# Patient Record
Sex: MALE | Race: BLACK OR AFRICAN AMERICAN | NOT HISPANIC OR LATINO | URBAN - METROPOLITAN AREA
[De-identification: names, ages, dates, MRNs, and addresses within clinical notes are randomized per-mention and may not be internally consistent; named-entity substitution may affect disease eponyms.]

---

## 2017-03-02 ENCOUNTER — INPATIENT (INPATIENT)
Facility: HOSPITAL | Age: 82
LOS: 3 days | Discharge: HOME | End: 2017-03-06
Attending: INTERNAL MEDICINE | Admitting: INTERNAL MEDICINE
Payer: MEDICARE

## 2017-03-02 VITALS
RESPIRATION RATE: 18 BRPM | SYSTOLIC BLOOD PRESSURE: 181 MMHG | DIASTOLIC BLOOD PRESSURE: 115 MMHG | HEART RATE: 94 BPM | OXYGEN SATURATION: 98 % | TEMPERATURE: 98 F

## 2017-03-02 DIAGNOSIS — I50.9 HEART FAILURE, UNSPECIFIED: ICD-10-CM

## 2017-03-02 DIAGNOSIS — Z29.9 ENCOUNTER FOR PROPHYLACTIC MEASURES, UNSPECIFIED: ICD-10-CM

## 2017-03-02 DIAGNOSIS — I10 ESSENTIAL (PRIMARY) HYPERTENSION: ICD-10-CM

## 2017-03-02 LAB
ALBUMIN SERPL ELPH-MCNC: 4 G/DL — SIGNIFICANT CHANGE UP (ref 3.3–5)
ALP SERPL-CCNC: 105 U/L — SIGNIFICANT CHANGE UP (ref 40–120)
ALT FLD-CCNC: 22 U/L — SIGNIFICANT CHANGE UP (ref 4–41)
APTT BLD: 34.6 SEC — SIGNIFICANT CHANGE UP (ref 27.5–37.4)
AST SERPL-CCNC: 23 U/L — SIGNIFICANT CHANGE UP (ref 4–40)
BASE EXCESS BLDV CALC-SCNC: 3.3 MMOL/L — SIGNIFICANT CHANGE UP
BASOPHILS # BLD AUTO: 0.04 K/UL — SIGNIFICANT CHANGE UP (ref 0–0.2)
BASOPHILS NFR BLD AUTO: 0.5 % — SIGNIFICANT CHANGE UP (ref 0–2)
BILIRUB SERPL-MCNC: 1.6 MG/DL — HIGH (ref 0.2–1.2)
BLOOD GAS VENOUS - CREATININE: 3 MG/DL — HIGH (ref 0.5–1.3)
BUN SERPL-MCNC: 35 MG/DL — HIGH (ref 7–23)
CALCIUM SERPL-MCNC: 9 MG/DL — SIGNIFICANT CHANGE UP (ref 8.4–10.5)
CHLORIDE BLDV-SCNC: 104 MMOL/L — SIGNIFICANT CHANGE UP (ref 96–108)
CHLORIDE SERPL-SCNC: 106 MMOL/L — SIGNIFICANT CHANGE UP (ref 98–107)
CK MB BLD-MCNC: 1.5 — SIGNIFICANT CHANGE UP (ref 0–2.5)
CK MB BLD-MCNC: 1.6 — SIGNIFICANT CHANGE UP (ref 0–2.5)
CK MB BLD-MCNC: 3.33 NG/ML — SIGNIFICANT CHANGE UP (ref 1–6.6)
CK MB BLD-MCNC: 3.91 NG/ML — SIGNIFICANT CHANGE UP (ref 1–6.6)
CK SERPL-CCNC: 217 U/L — HIGH (ref 30–200)
CK SERPL-CCNC: 248 U/L — HIGH (ref 30–200)
CO2 SERPL-SCNC: 25 MMOL/L — SIGNIFICANT CHANGE UP (ref 22–31)
CREAT SERPL-MCNC: 3.01 MG/DL — HIGH (ref 0.5–1.3)
EOSINOPHIL # BLD AUTO: 0.04 K/UL — SIGNIFICANT CHANGE UP (ref 0–0.5)
EOSINOPHIL NFR BLD AUTO: 0.5 % — SIGNIFICANT CHANGE UP (ref 0–6)
GAS PNL BLDV: 139 MMOL/L — SIGNIFICANT CHANGE UP (ref 136–146)
GLUCOSE BLDV-MCNC: 106 — HIGH (ref 70–99)
GLUCOSE SERPL-MCNC: 116 MG/DL — HIGH (ref 70–99)
HCO3 BLDV-SCNC: 26 MMOL/L — SIGNIFICANT CHANGE UP (ref 20–27)
HCT VFR BLD CALC: 41.1 % — SIGNIFICANT CHANGE UP (ref 39–50)
HCT VFR BLDV CALC: 39.9 % — SIGNIFICANT CHANGE UP (ref 39–51)
HGB BLD-MCNC: 12.9 G/DL — LOW (ref 13–17)
HGB BLDV-MCNC: 13 G/DL — SIGNIFICANT CHANGE UP (ref 13–17)
IMM GRANULOCYTES NFR BLD AUTO: 0.1 % — SIGNIFICANT CHANGE UP (ref 0–1.5)
INR BLD: 1.18 — SIGNIFICANT CHANGE UP (ref 0.87–1.18)
LACTATE BLDV-MCNC: 2.4 MMOL/L — HIGH (ref 0.5–2)
LYMPHOCYTES # BLD AUTO: 1.05 K/UL — SIGNIFICANT CHANGE UP (ref 1–3.3)
LYMPHOCYTES # BLD AUTO: 12.5 % — LOW (ref 13–44)
MCHC RBC-ENTMCNC: 27.8 PG — SIGNIFICANT CHANGE UP (ref 27–34)
MCHC RBC-ENTMCNC: 31.4 % — LOW (ref 32–36)
MCV RBC AUTO: 88.6 FL — SIGNIFICANT CHANGE UP (ref 80–100)
MONOCYTES # BLD AUTO: 0.64 K/UL — SIGNIFICANT CHANGE UP (ref 0–0.9)
MONOCYTES NFR BLD AUTO: 7.6 % — SIGNIFICANT CHANGE UP (ref 2–14)
NEUTROPHILS # BLD AUTO: 6.61 K/UL — SIGNIFICANT CHANGE UP (ref 1.8–7.4)
NEUTROPHILS NFR BLD AUTO: 78.8 % — HIGH (ref 43–77)
NT-PROBNP SERPL-SCNC: SIGNIFICANT CHANGE UP PG/ML
PCO2 BLDV: 54 MMHG — HIGH (ref 41–51)
PH BLDV: 7.34 PH — SIGNIFICANT CHANGE UP (ref 7.32–7.43)
PLATELET # BLD AUTO: 204 K/UL — SIGNIFICANT CHANGE UP (ref 150–400)
PMV BLD: 12.5 FL — SIGNIFICANT CHANGE UP (ref 7–13)
PO2 BLDV: 34 MMHG — LOW (ref 35–40)
POTASSIUM BLDV-SCNC: 3.8 MMOL/L — SIGNIFICANT CHANGE UP (ref 3.4–4.5)
POTASSIUM SERPL-MCNC: 4.1 MMOL/L — SIGNIFICANT CHANGE UP (ref 3.5–5.3)
POTASSIUM SERPL-SCNC: 4.1 MMOL/L — SIGNIFICANT CHANGE UP (ref 3.5–5.3)
PROT SERPL-MCNC: 6.4 G/DL — SIGNIFICANT CHANGE UP (ref 6–8.3)
PROTHROM AB SERPL-ACNC: 13.5 SEC — HIGH (ref 10–13.1)
RBC # BLD: 4.64 M/UL — SIGNIFICANT CHANGE UP (ref 4.2–5.8)
RBC # FLD: 15.5 % — HIGH (ref 10.3–14.5)
SAO2 % BLDV: 55.9 % — LOW (ref 60–85)
SODIUM SERPL-SCNC: 148 MMOL/L — HIGH (ref 135–145)
TROPONIN T SERPL-MCNC: 0.06 NG/ML — SIGNIFICANT CHANGE UP (ref 0–0.06)
TROPONIN T SERPL-MCNC: < 0.06 NG/ML — SIGNIFICANT CHANGE UP (ref 0–0.06)
WBC # BLD: 8.39 K/UL — SIGNIFICANT CHANGE UP (ref 3.8–10.5)
WBC # FLD AUTO: 8.39 K/UL — SIGNIFICANT CHANGE UP (ref 3.8–10.5)

## 2017-03-02 PROCEDURE — 74176 CT ABD & PELVIS W/O CONTRAST: CPT | Mod: 26

## 2017-03-02 PROCEDURE — 99223 1ST HOSP IP/OBS HIGH 75: CPT

## 2017-03-02 PROCEDURE — 71010: CPT | Mod: 26

## 2017-03-02 RX ORDER — FUROSEMIDE 40 MG
40 TABLET ORAL ONCE
Qty: 0 | Refills: 0 | Status: COMPLETED | OUTPATIENT
Start: 2017-03-02 | End: 2017-03-02

## 2017-03-02 RX ORDER — NIFEDIPINE 30 MG
90 TABLET, EXTENDED RELEASE 24 HR ORAL ONCE
Qty: 0 | Refills: 0 | Status: COMPLETED | OUTPATIENT
Start: 2017-03-02 | End: 2017-03-02

## 2017-03-02 RX ORDER — VALSARTAN 80 MG/1
160 TABLET ORAL ONCE
Qty: 0 | Refills: 0 | Status: COMPLETED | OUTPATIENT
Start: 2017-03-02 | End: 2017-03-02

## 2017-03-02 RX ORDER — FUROSEMIDE 40 MG
40 TABLET ORAL DAILY
Qty: 0 | Refills: 0 | Status: DISCONTINUED | OUTPATIENT
Start: 2017-03-02 | End: 2017-03-03

## 2017-03-02 RX ORDER — ASPIRIN/CALCIUM CARB/MAGNESIUM 324 MG
81 TABLET ORAL DAILY
Qty: 0 | Refills: 0 | Status: DISCONTINUED | OUTPATIENT
Start: 2017-03-02 | End: 2017-03-06

## 2017-03-02 RX ORDER — SODIUM CHLORIDE 9 MG/ML
3 INJECTION INTRAMUSCULAR; INTRAVENOUS; SUBCUTANEOUS EVERY 8 HOURS
Qty: 0 | Refills: 0 | Status: DISCONTINUED | OUTPATIENT
Start: 2017-03-02 | End: 2017-03-06

## 2017-03-02 RX ORDER — NIFEDIPINE 30 MG
90 TABLET, EXTENDED RELEASE 24 HR ORAL DAILY
Qty: 0 | Refills: 0 | Status: DISCONTINUED | OUTPATIENT
Start: 2017-03-02 | End: 2017-03-03

## 2017-03-02 RX ORDER — HEPARIN SODIUM 5000 [USP'U]/ML
5000 INJECTION INTRAVENOUS; SUBCUTANEOUS EVERY 8 HOURS
Qty: 0 | Refills: 0 | Status: DISCONTINUED | OUTPATIENT
Start: 2017-03-02 | End: 2017-03-06

## 2017-03-02 RX ADMIN — VALSARTAN 160 MILLIGRAM(S): 80 TABLET ORAL at 21:30

## 2017-03-02 RX ADMIN — Medication 40 MILLIGRAM(S): at 16:50

## 2017-03-02 RX ADMIN — Medication 90 MILLIGRAM(S): at 21:13

## 2017-03-02 NOTE — ED ADULT TRIAGE NOTE - CHIEF COMPLAINT QUOTE
p/t c/o of sob and swelling to ble for past few days p/t denies any chest discomfort appears uncomfortable

## 2017-03-02 NOTE — ED PROVIDER NOTE - MEDICAL DECISION MAKING DETAILS
80 yo M PMH HTN c/o 6 months SOB and abdominal distension  R/O Heart Failure, Abdominal Obstruction  CBC/CMP, ProBNP, CEx2, EKG, CXR, possible CT Abdomen

## 2017-03-02 NOTE — ED ADULT NURSE NOTE - OBJECTIVE STATEMENT
Pt recd A+Ox3. C/o +MASON and b/l LE swelling x6 months. States that he came into ED today because he just didn't feel right. Swollen, rounded, distended abd noted on assessment. Pt c/o feeling swollen in abd area but denies pain. Denies CP, palpitations, weight loss ot gain that he noticed. +3 edema noted to b/l LE and abd on assessment with small areas of weeping. Pt able to speak in full sentences without distress and O2 sat 100% on RA. Cardiac monitor in place. Will continue to monitor.

## 2017-03-02 NOTE — H&P ADULT. - NEGATIVE ENMT SYMPTOMS
no abnormal taste sensation/no tinnitus/no nose bleeds/no sinus symptoms/no post-nasal discharge/no nasal congestion/no vertigo/no gum bleeding

## 2017-03-02 NOTE — H&P ADULT. - NEGATIVE OPHTHALMOLOGIC SYMPTOMS
no lacrimation R/no discharge R/no discharge L/no photophobia/no lacrimation L/no blurred vision L/no blurred vision R

## 2017-03-02 NOTE — ED PROVIDER NOTE - OBJECTIVE STATEMENT
82 yo M [facility] HTN and glaucoma presents with SOB and abdominal distension for the past 6 months. Pt reports intermittent SOB, exacerbated by exertion and relieved by rest. c/o sob x 6 months- intermittent in nature - states cannot walk more than 2 blocks ore one flight of stairs without becoming dyspneic  Abdominal distension has been present consistently for the past 6 months, he feels the need to move his bowels frequently. Pt admits to having increased LE edema over the past 6 months as well. Pt used to take antihypertensives, but has not taken them in a long time because he states his pressure was normal. Seen by his ophthalmologist today who is treating him for an eye infection of the right eye, pt denies any occular pain or loss of vision. Has not been to his PMD in over a year. Denies cough, hemoptysis, chest pain, syncope, dizziness, N/V/D, constipation, hematochezia, dysuria, incontinence, hematuria.

## 2017-03-02 NOTE — H&P ADULT. - PROBLEM SELECTOR PLAN 1
CM  F/U CE & EKG   Monitor I's O's daily weights and Lytes   Give O2, ASA, Lasix 40 mg IV, Zaroxolyn po   F/U TTE, PT and Dietary consults

## 2017-03-03 LAB
BUN SERPL-MCNC: 37 MG/DL — HIGH (ref 7–23)
CALCIUM SERPL-MCNC: 9.2 MG/DL — SIGNIFICANT CHANGE UP (ref 8.4–10.5)
CHLORIDE SERPL-SCNC: 101 MMOL/L — SIGNIFICANT CHANGE UP (ref 98–107)
CHOLEST SERPL-MCNC: 130 MG/DL — SIGNIFICANT CHANGE UP (ref 120–199)
CO2 SERPL-SCNC: 26 MMOL/L — SIGNIFICANT CHANGE UP (ref 22–31)
CREAT SERPL-MCNC: 2.83 MG/DL — HIGH (ref 0.5–1.3)
GLUCOSE SERPL-MCNC: 109 MG/DL — HIGH (ref 70–99)
HBA1C BLD-MCNC: 5.8 % — HIGH (ref 4–5.6)
HCT VFR BLD CALC: 39.9 % — SIGNIFICANT CHANGE UP (ref 39–50)
HDLC SERPL-MCNC: 43 MG/DL — SIGNIFICANT CHANGE UP (ref 35–55)
HGB BLD-MCNC: 12.4 G/DL — LOW (ref 13–17)
LIPID PNL WITH DIRECT LDL SERPL: 76 MG/DL — SIGNIFICANT CHANGE UP
MCHC RBC-ENTMCNC: 27.1 PG — SIGNIFICANT CHANGE UP (ref 27–34)
MCHC RBC-ENTMCNC: 31.1 % — LOW (ref 32–36)
MCV RBC AUTO: 87.3 FL — SIGNIFICANT CHANGE UP (ref 80–100)
PLATELET # BLD AUTO: 206 K/UL — SIGNIFICANT CHANGE UP (ref 150–400)
PMV BLD: 13.2 FL — HIGH (ref 7–13)
POTASSIUM SERPL-MCNC: 3.9 MMOL/L — SIGNIFICANT CHANGE UP (ref 3.5–5.3)
POTASSIUM SERPL-SCNC: 3.9 MMOL/L — SIGNIFICANT CHANGE UP (ref 3.5–5.3)
RBC # BLD: 4.57 M/UL — SIGNIFICANT CHANGE UP (ref 4.2–5.8)
RBC # FLD: 15.6 % — HIGH (ref 10.3–14.5)
SODIUM SERPL-SCNC: 144 MMOL/L — SIGNIFICANT CHANGE UP (ref 135–145)
TRIGL SERPL-MCNC: 86 MG/DL — SIGNIFICANT CHANGE UP (ref 10–149)
WBC # BLD: 9.81 K/UL — SIGNIFICANT CHANGE UP (ref 3.8–10.5)
WBC # FLD AUTO: 9.81 K/UL — SIGNIFICANT CHANGE UP (ref 3.8–10.5)

## 2017-03-03 PROCEDURE — 99223 1ST HOSP IP/OBS HIGH 75: CPT | Mod: GC

## 2017-03-03 PROCEDURE — 99233 SBSQ HOSP IP/OBS HIGH 50: CPT

## 2017-03-03 PROCEDURE — 93306 TTE W/DOPPLER COMPLETE: CPT | Mod: 26

## 2017-03-03 RX ORDER — CARVEDILOL PHOSPHATE 80 MG/1
6.25 CAPSULE, EXTENDED RELEASE ORAL EVERY 12 HOURS
Qty: 0 | Refills: 0 | Status: DISCONTINUED | OUTPATIENT
Start: 2017-03-03 | End: 2017-03-05

## 2017-03-03 RX ORDER — LANOLIN ALCOHOL/MO/W.PET/CERES
3 CREAM (GRAM) TOPICAL AT BEDTIME
Qty: 0 | Refills: 0 | Status: DISCONTINUED | OUTPATIENT
Start: 2017-03-03 | End: 2017-03-06

## 2017-03-03 RX ORDER — FUROSEMIDE 40 MG
40 TABLET ORAL
Qty: 0 | Refills: 0 | Status: DISCONTINUED | OUTPATIENT
Start: 2017-03-03 | End: 2017-03-05

## 2017-03-03 RX ADMIN — HEPARIN SODIUM 5000 UNIT(S): 5000 INJECTION INTRAVENOUS; SUBCUTANEOUS at 12:43

## 2017-03-03 RX ADMIN — Medication 81 MILLIGRAM(S): at 12:42

## 2017-03-03 RX ADMIN — SODIUM CHLORIDE 3 MILLILITER(S): 9 INJECTION INTRAMUSCULAR; INTRAVENOUS; SUBCUTANEOUS at 23:11

## 2017-03-03 RX ADMIN — Medication 40 MILLIGRAM(S): at 17:32

## 2017-03-03 RX ADMIN — Medication 90 MILLIGRAM(S): at 05:28

## 2017-03-03 RX ADMIN — Medication 3 MILLIGRAM(S): at 02:09

## 2017-03-03 RX ADMIN — CARVEDILOL PHOSPHATE 6.25 MILLIGRAM(S): 80 CAPSULE, EXTENDED RELEASE ORAL at 17:35

## 2017-03-03 RX ADMIN — SODIUM CHLORIDE 3 MILLILITER(S): 9 INJECTION INTRAMUSCULAR; INTRAVENOUS; SUBCUTANEOUS at 12:43

## 2017-03-03 RX ADMIN — Medication 40 MILLIGRAM(S): at 05:28

## 2017-03-03 RX ADMIN — HEPARIN SODIUM 5000 UNIT(S): 5000 INJECTION INTRAVENOUS; SUBCUTANEOUS at 05:28

## 2017-03-03 RX ADMIN — SODIUM CHLORIDE 3 MILLILITER(S): 9 INJECTION INTRAMUSCULAR; INTRAVENOUS; SUBCUTANEOUS at 06:33

## 2017-03-03 RX ADMIN — HEPARIN SODIUM 5000 UNIT(S): 5000 INJECTION INTRAVENOUS; SUBCUTANEOUS at 23:11

## 2017-03-03 NOTE — PHYSICAL THERAPY INITIAL EVALUATION ADULT - PERTINENT HX OF CURRENT PROBLEM, REHAB EVAL
Pt is a 80 y/o male self with history of HTN has been experiencing progressive SOB over the past few months and abdominal bloating.

## 2017-03-03 NOTE — DIETITIAN INITIAL EVALUATION ADULT. - OTHER INFO
Received nutrition consult for CHF Exacerbation. Reports good appetite and po intake and denies nausea/vomiting/diarrhea/constipation or any issues with chewing/swallowing. Reinforced low na diet principles in view of heart failure.

## 2017-03-04 LAB
APPEARANCE UR: CLEAR — SIGNIFICANT CHANGE UP
BILIRUB UR-MCNC: NEGATIVE — SIGNIFICANT CHANGE UP
BLOOD UR QL VISUAL: HIGH
BUN SERPL-MCNC: 36 MG/DL — HIGH (ref 7–23)
CALCIUM SERPL-MCNC: 9 MG/DL — SIGNIFICANT CHANGE UP (ref 8.4–10.5)
CHLORIDE SERPL-SCNC: 98 MMOL/L — SIGNIFICANT CHANGE UP (ref 98–107)
CO2 SERPL-SCNC: 28 MMOL/L — SIGNIFICANT CHANGE UP (ref 22–31)
COLOR SPEC: SIGNIFICANT CHANGE UP
CREAT ?TM UR-MCNC: 33.88 MG/DL — SIGNIFICANT CHANGE UP
CREAT SERPL-MCNC: 2.55 MG/DL — HIGH (ref 0.5–1.3)
GLUCOSE SERPL-MCNC: 103 MG/DL — HIGH (ref 70–99)
GLUCOSE UR-MCNC: NEGATIVE — SIGNIFICANT CHANGE UP
HCT VFR BLD CALC: 38.8 % — LOW (ref 39–50)
HGB BLD-MCNC: 12.3 G/DL — LOW (ref 13–17)
KETONES UR-MCNC: NEGATIVE — SIGNIFICANT CHANGE UP
LEUKOCYTE ESTERASE UR-ACNC: NEGATIVE — SIGNIFICANT CHANGE UP
MAGNESIUM SERPL-MCNC: 1.8 MG/DL — SIGNIFICANT CHANGE UP (ref 1.6–2.6)
MCHC RBC-ENTMCNC: 27.8 PG — SIGNIFICANT CHANGE UP (ref 27–34)
MCHC RBC-ENTMCNC: 31.7 % — LOW (ref 32–36)
MCV RBC AUTO: 87.8 FL — SIGNIFICANT CHANGE UP (ref 80–100)
MUCOUS THREADS # UR AUTO: SIGNIFICANT CHANGE UP
NITRITE UR-MCNC: NEGATIVE — SIGNIFICANT CHANGE UP
OSMOLALITY SERPL: 294 MOSMO/KG — SIGNIFICANT CHANGE UP (ref 275–295)
PH UR: 6 — SIGNIFICANT CHANGE UP (ref 4.6–8)
PHOSPHATE SERPL-MCNC: 3.4 MG/DL — SIGNIFICANT CHANGE UP (ref 2.5–4.5)
PLATELET # BLD AUTO: 190 K/UL — SIGNIFICANT CHANGE UP (ref 150–400)
PMV BLD: 12.7 FL — SIGNIFICANT CHANGE UP (ref 7–13)
POTASSIUM SERPL-MCNC: 3.7 MMOL/L — SIGNIFICANT CHANGE UP (ref 3.5–5.3)
POTASSIUM SERPL-SCNC: 3.7 MMOL/L — SIGNIFICANT CHANGE UP (ref 3.5–5.3)
PROT UR-MCNC: 10 — SIGNIFICANT CHANGE UP
PROT UR-MCNC: 15.2 MG/DL — SIGNIFICANT CHANGE UP
RBC # BLD: 4.42 M/UL — SIGNIFICANT CHANGE UP (ref 4.2–5.8)
RBC # FLD: 15.4 % — HIGH (ref 10.3–14.5)
RBC CASTS # UR COMP ASSIST: >50 — HIGH (ref 0–?)
SODIUM SERPL-SCNC: 143 MMOL/L — SIGNIFICANT CHANGE UP (ref 135–145)
SODIUM UR-SCNC: 125 MEQ/L — SIGNIFICANT CHANGE UP
SP GR SPEC: 1.01 — SIGNIFICANT CHANGE UP (ref 1–1.03)
UROBILINOGEN FLD QL: NORMAL E.U. — SIGNIFICANT CHANGE UP (ref 0.1–0.2)
UUN UR-MCNC: 164.2 MG/DL — SIGNIFICANT CHANGE UP
WBC # BLD: 7.22 K/UL — SIGNIFICANT CHANGE UP (ref 3.8–10.5)
WBC # FLD AUTO: 7.22 K/UL — SIGNIFICANT CHANGE UP (ref 3.8–10.5)
WBC UR QL: SIGNIFICANT CHANGE UP (ref 0–?)

## 2017-03-04 PROCEDURE — 99233 SBSQ HOSP IP/OBS HIGH 50: CPT | Mod: GC

## 2017-03-04 PROCEDURE — 99232 SBSQ HOSP IP/OBS MODERATE 35: CPT

## 2017-03-04 RX ADMIN — HEPARIN SODIUM 5000 UNIT(S): 5000 INJECTION INTRAVENOUS; SUBCUTANEOUS at 21:33

## 2017-03-04 RX ADMIN — CARVEDILOL PHOSPHATE 6.25 MILLIGRAM(S): 80 CAPSULE, EXTENDED RELEASE ORAL at 06:10

## 2017-03-04 RX ADMIN — Medication 40 MILLIGRAM(S): at 17:04

## 2017-03-04 RX ADMIN — HEPARIN SODIUM 5000 UNIT(S): 5000 INJECTION INTRAVENOUS; SUBCUTANEOUS at 06:10

## 2017-03-04 RX ADMIN — Medication 3 MILLIGRAM(S): at 21:38

## 2017-03-04 RX ADMIN — SODIUM CHLORIDE 3 MILLILITER(S): 9 INJECTION INTRAMUSCULAR; INTRAVENOUS; SUBCUTANEOUS at 06:09

## 2017-03-04 RX ADMIN — Medication 81 MILLIGRAM(S): at 11:41

## 2017-03-04 RX ADMIN — Medication 40 MILLIGRAM(S): at 06:10

## 2017-03-04 RX ADMIN — SODIUM CHLORIDE 3 MILLILITER(S): 9 INJECTION INTRAMUSCULAR; INTRAVENOUS; SUBCUTANEOUS at 13:16

## 2017-03-04 RX ADMIN — CARVEDILOL PHOSPHATE 6.25 MILLIGRAM(S): 80 CAPSULE, EXTENDED RELEASE ORAL at 17:04

## 2017-03-04 RX ADMIN — SODIUM CHLORIDE 3 MILLILITER(S): 9 INJECTION INTRAMUSCULAR; INTRAVENOUS; SUBCUTANEOUS at 21:31

## 2017-03-04 RX ADMIN — HEPARIN SODIUM 5000 UNIT(S): 5000 INJECTION INTRAVENOUS; SUBCUTANEOUS at 13:16

## 2017-03-05 LAB
BUN SERPL-MCNC: 38 MG/DL — HIGH (ref 7–23)
CALCIUM SERPL-MCNC: 8.5 MG/DL — SIGNIFICANT CHANGE UP (ref 8.4–10.5)
CHLORIDE SERPL-SCNC: 95 MMOL/L — LOW (ref 98–107)
CO2 SERPL-SCNC: 28 MMOL/L — SIGNIFICANT CHANGE UP (ref 22–31)
CREAT SERPL-MCNC: 2.66 MG/DL — HIGH (ref 0.5–1.3)
GLUCOSE SERPL-MCNC: 132 MG/DL — HIGH (ref 70–99)
HCT VFR BLD CALC: 35.6 % — LOW (ref 39–50)
HGB BLD-MCNC: 11.4 G/DL — LOW (ref 13–17)
MAGNESIUM SERPL-MCNC: 1.7 MG/DL — SIGNIFICANT CHANGE UP (ref 1.6–2.6)
MCHC RBC-ENTMCNC: 28.1 PG — SIGNIFICANT CHANGE UP (ref 27–34)
MCHC RBC-ENTMCNC: 32 % — SIGNIFICANT CHANGE UP (ref 32–36)
MCV RBC AUTO: 87.7 FL — SIGNIFICANT CHANGE UP (ref 80–100)
OSMOLALITY SERPL: 294 MOSMO/KG — SIGNIFICANT CHANGE UP (ref 275–295)
PHOSPHATE SERPL-MCNC: 3.5 MG/DL — SIGNIFICANT CHANGE UP (ref 2.5–4.5)
PLATELET # BLD AUTO: 190 K/UL — SIGNIFICANT CHANGE UP (ref 150–400)
PMV BLD: 12.6 FL — SIGNIFICANT CHANGE UP (ref 7–13)
POTASSIUM SERPL-MCNC: 3.5 MMOL/L — SIGNIFICANT CHANGE UP (ref 3.5–5.3)
POTASSIUM SERPL-SCNC: 3.5 MMOL/L — SIGNIFICANT CHANGE UP (ref 3.5–5.3)
RBC # BLD: 4.06 M/UL — LOW (ref 4.2–5.8)
RBC # FLD: 15.1 % — HIGH (ref 10.3–14.5)
SODIUM SERPL-SCNC: 139 MMOL/L — SIGNIFICANT CHANGE UP (ref 135–145)
WBC # BLD: 6.89 K/UL — SIGNIFICANT CHANGE UP (ref 3.8–10.5)
WBC # FLD AUTO: 6.89 K/UL — SIGNIFICANT CHANGE UP (ref 3.8–10.5)

## 2017-03-05 PROCEDURE — 99232 SBSQ HOSP IP/OBS MODERATE 35: CPT

## 2017-03-05 PROCEDURE — 99233 SBSQ HOSP IP/OBS HIGH 50: CPT | Mod: GC

## 2017-03-05 RX ORDER — POTASSIUM CHLORIDE 20 MEQ
40 PACKET (EA) ORAL ONCE
Qty: 0 | Refills: 0 | Status: COMPLETED | OUTPATIENT
Start: 2017-03-05 | End: 2017-03-05

## 2017-03-05 RX ORDER — CARVEDILOL PHOSPHATE 80 MG/1
12.5 CAPSULE, EXTENDED RELEASE ORAL EVERY 12 HOURS
Qty: 0 | Refills: 0 | Status: DISCONTINUED | OUTPATIENT
Start: 2017-03-05 | End: 2017-03-06

## 2017-03-05 RX ORDER — ISOSORBIDE MONONITRATE 60 MG/1
30 TABLET, EXTENDED RELEASE ORAL DAILY
Qty: 0 | Refills: 0 | Status: DISCONTINUED | OUTPATIENT
Start: 2017-03-05 | End: 2017-03-06

## 2017-03-05 RX ORDER — HYDRALAZINE HCL 50 MG
25 TABLET ORAL THREE TIMES A DAY
Qty: 0 | Refills: 0 | Status: DISCONTINUED | OUTPATIENT
Start: 2017-03-05 | End: 2017-03-06

## 2017-03-05 RX ORDER — FUROSEMIDE 40 MG
40 TABLET ORAL EVERY 12 HOURS
Qty: 0 | Refills: 0 | Status: DISCONTINUED | OUTPATIENT
Start: 2017-03-05 | End: 2017-03-06

## 2017-03-05 RX ADMIN — CARVEDILOL PHOSPHATE 12.5 MILLIGRAM(S): 80 CAPSULE, EXTENDED RELEASE ORAL at 17:58

## 2017-03-05 RX ADMIN — Medication 40 MILLIEQUIVALENT(S): at 17:58

## 2017-03-05 RX ADMIN — Medication 40 MILLIGRAM(S): at 17:58

## 2017-03-05 RX ADMIN — ISOSORBIDE MONONITRATE 30 MILLIGRAM(S): 60 TABLET, EXTENDED RELEASE ORAL at 12:29

## 2017-03-05 RX ADMIN — Medication 25 MILLIGRAM(S): at 21:46

## 2017-03-05 RX ADMIN — SODIUM CHLORIDE 3 MILLILITER(S): 9 INJECTION INTRAMUSCULAR; INTRAVENOUS; SUBCUTANEOUS at 14:53

## 2017-03-05 RX ADMIN — Medication 81 MILLIGRAM(S): at 12:29

## 2017-03-05 RX ADMIN — HEPARIN SODIUM 5000 UNIT(S): 5000 INJECTION INTRAVENOUS; SUBCUTANEOUS at 21:46

## 2017-03-05 RX ADMIN — SODIUM CHLORIDE 3 MILLILITER(S): 9 INJECTION INTRAMUSCULAR; INTRAVENOUS; SUBCUTANEOUS at 06:09

## 2017-03-05 RX ADMIN — SODIUM CHLORIDE 3 MILLILITER(S): 9 INJECTION INTRAMUSCULAR; INTRAVENOUS; SUBCUTANEOUS at 21:46

## 2017-03-05 RX ADMIN — Medication 25 MILLIGRAM(S): at 13:54

## 2017-03-05 RX ADMIN — HEPARIN SODIUM 5000 UNIT(S): 5000 INJECTION INTRAVENOUS; SUBCUTANEOUS at 13:54

## 2017-03-05 RX ADMIN — Medication 25 MILLIGRAM(S): at 06:09

## 2017-03-05 RX ADMIN — CARVEDILOL PHOSPHATE 12.5 MILLIGRAM(S): 80 CAPSULE, EXTENDED RELEASE ORAL at 06:08

## 2017-03-05 RX ADMIN — Medication 3 MILLIGRAM(S): at 21:46

## 2017-03-05 RX ADMIN — Medication 40 MILLIGRAM(S): at 06:09

## 2017-03-05 RX ADMIN — HEPARIN SODIUM 5000 UNIT(S): 5000 INJECTION INTRAVENOUS; SUBCUTANEOUS at 06:09

## 2017-03-05 NOTE — DISCHARGE NOTE ADULT - CARE PROVIDER_API CALL
Rasheed Gaines (MD; PhD), Cardiology; Internal Medicine; Vascular Medicine  [address] [place] [address]  Phone: [phone]  Fax: [phone] Rasheed Gaines (MD; PhD), Cardiology; Internal Medicine; Vascular Medicine  [id number] [place] [address]  Phone: [phone]  Fax: [phone]    Diane Bruner), Internal Medicine  [address] [place] [address]  Phone: [phone]  Fax: [phone]

## 2017-03-05 NOTE — PROVIDER CONTACT NOTE (OTHER) - ASSESSMENT
Patient has lee, patients tip of pens swollen, red and ? rash. Patient denies any discomfort to site

## 2017-03-05 NOTE — DISCHARGE NOTE ADULT - CARE PLAN
Principal Discharge DX:	Acute on chronic diastolic HF (heart failure)  Goal:	Maintain euvolemia and prevent worsening of heart failure.  Instructions for follow-up, activity and diet:	Follow up with PMD within 1 week. Take medications as prescribed. Monitor weight daily and record results. Bring these results to MD visit.  Diet - low salt, 1800 jacky ADA cardiac diet.  Secondary Diagnosis:	Essential hypertension  Goal:	Blood pressure control  Instructions for follow-up, activity and diet:	Low salt, low fat diet.  Take blood pressure medications as prescribed.  Please follow up with your PMD in 1-2 weeks for further medical management

## 2017-03-05 NOTE — DISCHARGE NOTE ADULT - MEDICATION SUMMARY - MEDICATIONS TO STOP TAKING
I will STOP taking the medications listed below when I get home from the hospital:    NIFEdipine 90 mg oral tablet, extended release  -- 1 tab(s) by mouth once a day

## 2017-03-05 NOTE — DISCHARGE NOTE ADULT - NS AS DC HF EDUCATION INSTRUCTIONS
Activities as tolerated/Call Primary Care Provider for follow-up after discharge/Monitor Weight Daily/Low salt diet/Report weight gain of 2 or more pounds in one day or 3 or more pounds in one week, worsening shortness of breath, fatigue, weakness, increased swelling of hands and feet to primary care provider

## 2017-03-05 NOTE — DISCHARGE NOTE ADULT - CARE PROVIDERS DIRECT ADDRESSES
[email or web address]

## 2017-03-05 NOTE — DISCHARGE NOTE ADULT - MEDICATION SUMMARY - MEDICATIONS TO TAKE
I will START or STAY ON the medications listed below when I get home from the hospital:    aspirin 81 mg oral delayed release tablet  -- 1 tab(s) by mouth once a day  -- Indication: For Cardioprotective    isosorbide mononitrate 30 mg oral tablet, extended release  -- 1 tab(s) by mouth once a day  -- Indication: For Heart failure    carvedilol 12.5 mg oral tablet  -- 1 tab(s) by mouth every 12 hours  -- Indication: For Hypertension    furosemide 40 mg oral tablet  -- 1 tab(s) by mouth every 12 hours  -- Indication: For Heart failure    metOLazone 2.5 mg oral tablet  -- 1 tab(s) by mouth once a day  -- Indication: For Heart failure    hydrALAZINE 25 mg oral tablet  -- 1 tab(s) by mouth every 8 hours  -- Indication: For Hypertension

## 2017-03-05 NOTE — DISCHARGE NOTE ADULT - PLAN OF CARE
Maintain euvolemia and prevent worsening of heart failure. Follow up with PMD within 1 week. Take medications as prescribed. Monitor weight daily and record results. Bring these results to MD visit.  Diet - low salt, 1800 jacky ADA cardiac diet. Blood pressure control Low salt, low fat diet.  Take blood pressure medications as prescribed.  Please follow up with your PMD in 1-2 weeks for further medical management

## 2017-03-05 NOTE — DISCHARGE NOTE ADULT - PATIENT PORTAL LINK FT
“You can access the FollowHealth Patient Portal, offered by [facility], by registering with the following website: [email or web address]”

## 2017-03-05 NOTE — DISCHARGE NOTE ADULT - ADDITIONAL INSTRUCTIONS
Please follow up with Dr. Gaines (Cardiologist) and Dr. Bruner (Renal) in 2 weeks.  Call for appointments. Please follow up with Dr. Gaines (Cardiologist) and Dr. Bruner (Renal) in 2 weeks.  Call for appointments.  Please call your PCP in hematuria or penile pain persists.

## 2017-03-05 NOTE — DISCHARGE NOTE ADULT - HOSPITAL COURSE
81M self ambulating, with of HTN has been experiencing progressive SOB over the past few months and abdominal bloating.  He denied dietary indiscretions with salt foods and recent weigh gain. He is able to ambulate a few blocks before experiencing dyspnea. Positive dry cough. No chest pain, palpitations, nausea, vomit, chills, diaphoresis.     Hospital Course    Cardiac enzymes tested were negative.  CXR showed Clear lungs with cardiomegaly. CT Abd showed no bowel obstruction or acute intra-abdominal pathology, cardiomegaly, small bilateral pleural effusions. Small ascites and anasarca. Patient was diuresed with Lasix and metolazone. Procardia was discontinued and Hydralazine, Imdur and Coreg were started and titrated.  Renal was consulted and recommended Renal sono that showed _________________.  TTE: Minimal mitral regurgitation. Calcified trileaflet aortic valve with normal opening. Minimal aortic regurgitation. Mildly dilated left atrium.  LA volume index = 38 cc/m2.Normal left ventricular internal dimensions and wall thicknesses. Normal left ventricular systolic function. No segmental wall motion abnormalities. Normal right ventricular size and function. Normal tricuspid valve. Mild tricuspid regurgitation.mild pulmonary hypertension. 81M self ambulating, with of HTN has been experiencing progressive SOB over the past few months and abdominal bloating.  He denied dietary indiscretions with salt foods and recent weigh gain. He is able to ambulate a few blocks before experiencing dyspnea. Positive dry cough. No chest pain, palpitations, nausea, vomit, chills, diaphoresis.     Hospital Course    Cardiac enzymes tested were negative.  CXR showed Clear lungs with cardiomegaly. CT Abd showed no bowel obstruction or acute intra-abdominal pathology, cardiomegaly, small bilateral pleural effusions. Small ascites and anasarca. Patient was diuresed with Lasix and metolazone. Procardia was discontinued and Hydralazine, Imdur and Coreg were started and titrated.  Renal was consulted and recommended Renal sonogram that showed a prominent prostate and no hydronephrosis.  TTE: Minimal mitral regurgitation. Calcified trileaflet aortic valve with normal opening. Minimal aortic regurgitation. Mildly dilated left atrium.  LA volume index = 38 cc/m2.Normal left ventricular internal dimensions and wall thicknesses. Normal left ventricular systolic function. No segmental wall motion abnormalities. Normal right ventricular size and function. Normal tricuspid valve. Mild tricuspid regurgitation.mild pulmonary hypertension. 81M self ambulating, with of HTN has been experiencing progressive SOB over the past few months and abdominal bloating.  He denied dietary indiscretions with salt foods and recent weigh gain. He is able to ambulate a few blocks before experiencing dyspnea. Positive dry cough. No chest pain, palpitations, nausea, vomit, chills, diaphoresis.     Hospital Course    Cardiac enzymes tested were negative.  CXR showed Clear lungs with cardiomegaly. CT Abd showed no bowel obstruction or acute intra-abdominal pathology, cardiomegaly, small bilateral pleural effusions. Small ascites and anasarca. Patient was diuresed with Lasix and metolazone. Procardia was discontinued and Hydralazine, Imdur and Coreg were started and titrated.  Renal was consulted and recommended Renal sonogram that showed a prominent prostate and no hydronephrosis.  TTE: Minimal mitral regurgitation. Calcified trileaflet aortic valve with normal opening. Minimal aortic regurgitation. Mildly dilated left atrium.  LA volume index = 38 cc/m2.Normal left ventricular internal dimensions and wall thicknesses. Normal left ventricular systolic function. No segmental wall motion abnormalities. Normal right ventricular size and function. Normal tricuspid valve. Mild tricuspid regurgitation.mild pulmonary hypertension.  Patient was switched to PO Lasix.  Renal ultrasound done on 3/6 showed prominent prostate and no hydronephrosis.  On 3/6, lee was removed and patient was noted to have hematuria and penile discoloration.  Urology consulted and    Patient is now clear for discharge home. 81M self ambulating, with of HTN has been experiencing progressive SOB over the past few months and abdominal bloating.  He denied dietary indiscretions with salt foods and recent weigh gain. He is able to ambulate a few blocks before experiencing dyspnea. Positive dry cough. No chest pain, palpitations, nausea, vomit, chills, diaphoresis.     Hospital Course    Cardiac enzymes tested were negative.  CXR showed Clear lungs with cardiomegaly. CT Abd showed no bowel obstruction or acute intra-abdominal pathology, cardiomegaly, small bilateral pleural effusions. Small ascites and anasarca. Patient was diuresed with Lasix and metolazone. Procardia was discontinued and Hydralazine, Imdur and Coreg were started and titrated.  Renal was consulted and recommended Renal sonogram that showed a prominent prostate and no hydronephrosis.  TTE: Minimal mitral regurgitation. Calcified trileaflet aortic valve with normal opening. Minimal aortic regurgitation. Mildly dilated left atrium.  LA volume index = 38 cc/m2.Normal left ventricular internal dimensions and wall thicknesses. Normal left ventricular systolic function. No segmental wall motion abnormalities. Normal right ventricular size and function. Normal tricuspid valve. Mild tricuspid regurgitation.mild pulmonary hypertension.  Patient was switched to PO Lasix.  Renal ultrasound done on 3/6 showed prominent prostate and no hydronephrosis.  On 3/6, lee was removed and patient was noted to have hematuria and penile discoloration.  Urology consulted and felt patient with normal anatomy and if symptoms persist he should contact his PMD.      Patient is now clear for discharge home.

## 2017-03-06 VITALS
DIASTOLIC BLOOD PRESSURE: 97 MMHG | HEART RATE: 79 BPM | OXYGEN SATURATION: 99 % | RESPIRATION RATE: 16 BRPM | SYSTOLIC BLOOD PRESSURE: 141 MMHG

## 2017-03-06 LAB
BUN SERPL-MCNC: 43 MG/DL — HIGH (ref 7–23)
CALCIUM SERPL-MCNC: 9 MG/DL — SIGNIFICANT CHANGE UP (ref 8.4–10.5)
CHLORIDE SERPL-SCNC: 94 MMOL/L — LOW (ref 98–107)
CO2 SERPL-SCNC: 28 MMOL/L — SIGNIFICANT CHANGE UP (ref 22–31)
CREAT SERPL-MCNC: 2.88 MG/DL — HIGH (ref 0.5–1.3)
GLUCOSE SERPL-MCNC: 100 MG/DL — HIGH (ref 70–99)
HCT VFR BLD CALC: 38.1 % — LOW (ref 39–50)
HGB BLD-MCNC: 12.1 G/DL — LOW (ref 13–17)
MAGNESIUM SERPL-MCNC: 1.8 MG/DL — SIGNIFICANT CHANGE UP (ref 1.6–2.6)
MCHC RBC-ENTMCNC: 27.5 PG — SIGNIFICANT CHANGE UP (ref 27–34)
MCHC RBC-ENTMCNC: 31.8 % — LOW (ref 32–36)
MCV RBC AUTO: 86.6 FL — SIGNIFICANT CHANGE UP (ref 80–100)
PLATELET # BLD AUTO: 206 K/UL — SIGNIFICANT CHANGE UP (ref 150–400)
PMV BLD: 13.1 FL — HIGH (ref 7–13)
POTASSIUM SERPL-MCNC: 3.7 MMOL/L — SIGNIFICANT CHANGE UP (ref 3.5–5.3)
POTASSIUM SERPL-SCNC: 3.7 MMOL/L — SIGNIFICANT CHANGE UP (ref 3.5–5.3)
RBC # BLD: 4.4 M/UL — SIGNIFICANT CHANGE UP (ref 4.2–5.8)
RBC # FLD: 15 % — HIGH (ref 10.3–14.5)
SODIUM SERPL-SCNC: 139 MMOL/L — SIGNIFICANT CHANGE UP (ref 135–145)
WBC # BLD: 7.53 K/UL — SIGNIFICANT CHANGE UP (ref 3.8–10.5)
WBC # FLD AUTO: 7.53 K/UL — SIGNIFICANT CHANGE UP (ref 3.8–10.5)

## 2017-03-06 PROCEDURE — 76775 US EXAM ABDO BACK WALL LIM: CPT | Mod: 26

## 2017-03-06 PROCEDURE — 99233 SBSQ HOSP IP/OBS HIGH 50: CPT | Mod: GC

## 2017-03-06 PROCEDURE — 99239 HOSP IP/OBS DSCHRG MGMT >30: CPT

## 2017-03-06 RX ORDER — NIFEDIPINE 30 MG
1 TABLET, EXTENDED RELEASE 24 HR ORAL
Qty: 0 | Refills: 0 | COMMUNITY

## 2017-03-06 RX ORDER — ASPIRIN/CALCIUM CARB/MAGNESIUM 324 MG
1 TABLET ORAL
Qty: 30 | Refills: 0
Start: 2017-03-06 | End: 2017-04-05

## 2017-03-06 RX ORDER — CARVEDILOL PHOSPHATE 80 MG/1
1 CAPSULE, EXTENDED RELEASE ORAL
Qty: 60 | Refills: 0
Start: 2017-03-06 | End: 2017-04-05

## 2017-03-06 RX ORDER — FUROSEMIDE 40 MG
1 TABLET ORAL
Qty: 60 | Refills: 0
Start: 2017-03-06 | End: 2017-04-05

## 2017-03-06 RX ORDER — HYDRALAZINE HCL 50 MG
1 TABLET ORAL
Qty: 90 | Refills: 0
Start: 2017-03-06 | End: 2017-04-05

## 2017-03-06 RX ORDER — ISOSORBIDE MONONITRATE 60 MG/1
1 TABLET, EXTENDED RELEASE ORAL
Qty: 30 | Refills: 0
Start: 2017-03-06 | End: 2017-04-05

## 2017-03-06 RX ADMIN — HEPARIN SODIUM 5000 UNIT(S): 5000 INJECTION INTRAVENOUS; SUBCUTANEOUS at 05:16

## 2017-03-06 RX ADMIN — CARVEDILOL PHOSPHATE 12.5 MILLIGRAM(S): 80 CAPSULE, EXTENDED RELEASE ORAL at 17:47

## 2017-03-06 RX ADMIN — SODIUM CHLORIDE 3 MILLILITER(S): 9 INJECTION INTRAMUSCULAR; INTRAVENOUS; SUBCUTANEOUS at 05:17

## 2017-03-06 RX ADMIN — ISOSORBIDE MONONITRATE 30 MILLIGRAM(S): 60 TABLET, EXTENDED RELEASE ORAL at 12:00

## 2017-03-06 RX ADMIN — Medication 25 MILLIGRAM(S): at 05:16

## 2017-03-06 RX ADMIN — SODIUM CHLORIDE 3 MILLILITER(S): 9 INJECTION INTRAMUSCULAR; INTRAVENOUS; SUBCUTANEOUS at 15:32

## 2017-03-06 RX ADMIN — Medication 25 MILLIGRAM(S): at 15:34

## 2017-03-06 RX ADMIN — Medication 81 MILLIGRAM(S): at 12:00

## 2017-03-06 RX ADMIN — Medication 40 MILLIGRAM(S): at 17:47

## 2017-03-06 RX ADMIN — Medication 40 MILLIGRAM(S): at 05:16

## 2017-03-06 RX ADMIN — CARVEDILOL PHOSPHATE 12.5 MILLIGRAM(S): 80 CAPSULE, EXTENDED RELEASE ORAL at 05:16

## 2017-03-10 PROBLEM — Z00.00 ENCOUNTER FOR PREVENTIVE HEALTH EXAMINATION: Status: ACTIVE | Noted: 2017-03-10

## 2017-03-10 PROBLEM — I10 ESSENTIAL (PRIMARY) HYPERTENSION: Chronic | Status: ACTIVE | Noted: 2017-03-02

## 2017-03-15 ENCOUNTER — EMERGENCY (EMERGENCY)
Facility: HOSPITAL | Age: 82
LOS: 1 days | Discharge: HOME | End: 2017-03-15
Attending: EMERGENCY MEDICINE | Admitting: EMERGENCY MEDICINE
Payer: MEDICARE

## 2017-03-15 VITALS
TEMPERATURE: 96 F | OXYGEN SATURATION: 98 % | RESPIRATION RATE: 18 BRPM | HEART RATE: 112 BPM | SYSTOLIC BLOOD PRESSURE: 100 MMHG | DIASTOLIC BLOOD PRESSURE: 48 MMHG

## 2017-03-15 VITALS
HEART RATE: 70 BPM | OXYGEN SATURATION: 98 % | DIASTOLIC BLOOD PRESSURE: 77 MMHG | RESPIRATION RATE: 16 BRPM | TEMPERATURE: 98 F | SYSTOLIC BLOOD PRESSURE: 149 MMHG

## 2017-03-15 LAB
ALBUMIN SERPL ELPH-MCNC: 3.8 G/DL — SIGNIFICANT CHANGE UP (ref 3.3–5)
ALP SERPL-CCNC: 81 U/L — SIGNIFICANT CHANGE UP (ref 40–120)
ALT FLD-CCNC: 26 U/L — SIGNIFICANT CHANGE UP (ref 4–41)
APTT BLD: 36.8 SEC — SIGNIFICANT CHANGE UP (ref 27.5–37.4)
AST SERPL-CCNC: 27 U/L — SIGNIFICANT CHANGE UP (ref 4–40)
BASE EXCESS BLDV CALC-SCNC: 15.2 MMOL/L — SIGNIFICANT CHANGE UP
BASE EXCESS BLDV CALC-SCNC: 16.2 MMOL/L — SIGNIFICANT CHANGE UP
BASOPHILS # BLD AUTO: 0.08 K/UL — SIGNIFICANT CHANGE UP (ref 0–0.2)
BASOPHILS NFR BLD AUTO: 0.7 % — SIGNIFICANT CHANGE UP (ref 0–2)
BILIRUB SERPL-MCNC: 1.2 MG/DL — SIGNIFICANT CHANGE UP (ref 0.2–1.2)
BLOOD GAS VENOUS - CREATININE: 2.96 MG/DL — HIGH (ref 0.5–1.3)
BLOOD GAS VENOUS - CREATININE: 3.1 MG/DL — HIGH (ref 0.5–1.3)
BUN SERPL-MCNC: 53 MG/DL — HIGH (ref 7–23)
CALCIUM SERPL-MCNC: 9.5 MG/DL — SIGNIFICANT CHANGE UP (ref 8.4–10.5)
CHLORIDE BLDV-SCNC: 84 MMOL/L — LOW (ref 96–108)
CHLORIDE BLDV-SCNC: 88 MMOL/L — LOW (ref 96–108)
CHLORIDE SERPL-SCNC: 85 MMOL/L — LOW (ref 98–107)
CO2 SERPL-SCNC: 33 MMOL/L — HIGH (ref 22–31)
CREAT SERPL-MCNC: 3.1 MG/DL — HIGH (ref 0.5–1.3)
EOSINOPHIL # BLD AUTO: 0.12 K/UL — SIGNIFICANT CHANGE UP (ref 0–0.5)
EOSINOPHIL NFR BLD AUTO: 1.1 % — SIGNIFICANT CHANGE UP (ref 0–6)
GAS PNL BLDV: 128 MMOL/L — LOW (ref 136–146)
GAS PNL BLDV: 131 MMOL/L — LOW (ref 136–146)
GLUCOSE BLDV-MCNC: 126 — HIGH (ref 70–99)
GLUCOSE BLDV-MCNC: 162 — HIGH (ref 70–99)
GLUCOSE SERPL-MCNC: 164 MG/DL — HIGH (ref 70–99)
HCO3 BLDV-SCNC: 36 MMOL/L — HIGH (ref 20–27)
HCO3 BLDV-SCNC: 37 MMOL/L — HIGH (ref 20–27)
HCT VFR BLD CALC: 41.4 % — SIGNIFICANT CHANGE UP (ref 39–50)
HCT VFR BLDV CALC: 41.6 % — SIGNIFICANT CHANGE UP (ref 39–51)
HCT VFR BLDV CALC: 42.2 % — SIGNIFICANT CHANGE UP (ref 39–51)
HGB BLD-MCNC: 13.3 G/DL — SIGNIFICANT CHANGE UP (ref 13–17)
HGB BLDV-MCNC: 13.6 G/DL — SIGNIFICANT CHANGE UP (ref 13–17)
HGB BLDV-MCNC: 13.7 G/DL — SIGNIFICANT CHANGE UP (ref 13–17)
IMM GRANULOCYTES NFR BLD AUTO: 0.2 % — SIGNIFICANT CHANGE UP (ref 0–1.5)
INR BLD: 1.03 — SIGNIFICANT CHANGE UP (ref 0.87–1.18)
LACTATE BLDV-MCNC: 2.4 MMOL/L — HIGH (ref 0.5–2)
LACTATE BLDV-MCNC: 2.6 MMOL/L — HIGH (ref 0.5–2)
LACTATE SERPL-SCNC: 2.3 MMOL/L — HIGH (ref 0.5–2)
LIDOCAIN IGE QN: 98.5 U/L — HIGH (ref 7–60)
LYMPHOCYTES # BLD AUTO: 1.48 K/UL — SIGNIFICANT CHANGE UP (ref 1–3.3)
LYMPHOCYTES # BLD AUTO: 13.3 % — SIGNIFICANT CHANGE UP (ref 13–44)
MCHC RBC-ENTMCNC: 27.8 PG — SIGNIFICANT CHANGE UP (ref 27–34)
MCHC RBC-ENTMCNC: 32.1 % — SIGNIFICANT CHANGE UP (ref 32–36)
MCV RBC AUTO: 86.6 FL — SIGNIFICANT CHANGE UP (ref 80–100)
MONOCYTES # BLD AUTO: 1.86 K/UL — HIGH (ref 0–0.9)
MONOCYTES NFR BLD AUTO: 16.7 % — HIGH (ref 2–14)
NEUTROPHILS # BLD AUTO: 7.58 K/UL — HIGH (ref 1.8–7.4)
NEUTROPHILS NFR BLD AUTO: 68 % — SIGNIFICANT CHANGE UP (ref 43–77)
NT-PROBNP SERPL-SCNC: 3024 PG/ML — SIGNIFICANT CHANGE UP
PCO2 BLDV: 63 MMHG — HIGH (ref 41–51)
PCO2 BLDV: 64 MMHG — HIGH (ref 41–51)
PH BLDV: 7.42 PH — SIGNIFICANT CHANGE UP (ref 7.32–7.43)
PH BLDV: 7.44 PH — HIGH (ref 7.32–7.43)
PLATELET # BLD AUTO: 231 K/UL — SIGNIFICANT CHANGE UP (ref 150–400)
PMV BLD: 12 FL — SIGNIFICANT CHANGE UP (ref 7–13)
PO2 BLDV: 29 MMHG — LOW (ref 35–40)
PO2 BLDV: < 24 MMHG — LOW (ref 35–40)
POTASSIUM BLDV-SCNC: 2.9 MMOL/L — LOW (ref 3.4–4.5)
POTASSIUM BLDV-SCNC: 3.3 MMOL/L — LOW (ref 3.4–4.5)
POTASSIUM SERPL-MCNC: 3.2 MMOL/L — LOW (ref 3.5–5.3)
POTASSIUM SERPL-SCNC: 3.2 MMOL/L — LOW (ref 3.5–5.3)
PROT SERPL-MCNC: 6.6 G/DL — SIGNIFICANT CHANGE UP (ref 6–8.3)
PROTHROM AB SERPL-ACNC: 11.7 SEC — SIGNIFICANT CHANGE UP (ref 10–13.1)
RBC # BLD: 4.78 M/UL — SIGNIFICANT CHANGE UP (ref 4.2–5.8)
RBC # FLD: 14.5 % — SIGNIFICANT CHANGE UP (ref 10.3–14.5)
SAO2 % BLDV: 31.2 % — LOW (ref 60–85)
SAO2 % BLDV: 47.5 % — LOW (ref 60–85)
SODIUM SERPL-SCNC: 133 MMOL/L — LOW (ref 135–145)
TROPONIN T SERPL-MCNC: 0.06 NG/ML — SIGNIFICANT CHANGE UP (ref 0–0.06)
WBC # BLD: 11.14 K/UL — HIGH (ref 3.8–10.5)
WBC # FLD AUTO: 11.14 K/UL — HIGH (ref 3.8–10.5)

## 2017-03-15 PROCEDURE — 71020: CPT | Mod: 26

## 2017-03-15 PROCEDURE — 99284 EMERGENCY DEPT VISIT MOD MDM: CPT | Mod: 25

## 2017-03-15 PROCEDURE — 93010 ELECTROCARDIOGRAM REPORT: CPT | Mod: 59

## 2017-03-15 RX ORDER — SODIUM CHLORIDE 9 MG/ML
1000 INJECTION INTRAMUSCULAR; INTRAVENOUS; SUBCUTANEOUS
Qty: 0 | Refills: 0 | Status: DISCONTINUED | OUTPATIENT
Start: 2017-03-15 | End: 2017-03-19

## 2017-03-15 RX ORDER — POTASSIUM CHLORIDE 20 MEQ
10 PACKET (EA) ORAL
Qty: 0 | Refills: 0 | Status: COMPLETED | OUTPATIENT
Start: 2017-03-15 | End: 2017-03-15

## 2017-03-15 RX ORDER — POTASSIUM CHLORIDE 20 MEQ
10 PACKET (EA) ORAL
Qty: 0 | Refills: 0 | Status: DISCONTINUED | OUTPATIENT
Start: 2017-03-15 | End: 2017-03-15

## 2017-03-15 RX ADMIN — Medication 100 MILLIEQUIVALENT(S): at 16:03

## 2017-03-15 RX ADMIN — Medication 100 MILLIEQUIVALENT(S): at 12:32

## 2017-03-15 RX ADMIN — SODIUM CHLORIDE 50 MILLILITER(S): 9 INJECTION INTRAMUSCULAR; INTRAVENOUS; SUBCUTANEOUS at 16:19

## 2017-03-15 RX ADMIN — Medication 100 MILLIEQUIVALENT(S): at 14:11

## 2017-03-15 RX ADMIN — SODIUM CHLORIDE 50 MILLILITER(S): 9 INJECTION INTRAMUSCULAR; INTRAVENOUS; SUBCUTANEOUS at 13:00

## 2017-03-15 RX ADMIN — SODIUM CHLORIDE 50 MILLILITER(S): 9 INJECTION INTRAMUSCULAR; INTRAVENOUS; SUBCUTANEOUS at 15:36

## 2017-03-15 NOTE — ED PROVIDER NOTE - MEDICAL DECISION MAKING DETAILS
81yom w/ CHF, recently discharged, one episode of vomiting. Asymptomatic now, benign abdomen. Pt well diuresed with no crackles or pedal edema. Sodium, potassium, chloride are low, will replete K, modest NaCl repletion and reassess. Pt possibly overdiuresed w/ decreased Po intake.

## 2017-03-15 NOTE — ED ADULT TRIAGE NOTE - CHIEF COMPLAINT QUOTE
p/t c/o of not feeling well since this am generalized weakness fall nausea and vomiting p/t very poor historian d/cd from hospital few days ago

## 2017-03-15 NOTE — ED ADULT NURSE NOTE - OBJECTIVE STATEMENT
Patient received to room 9 awake, alert, oriented x3, able to make needs known verbally.  Patient does not complain of pain.  Patient vitals recorded, hemodynamically stable, normal sinus rhythm on cardiac monitor.  Family at bedside for emotional support.  Patient s/p fall this morning at home, does not recall the fall or what happened prior.  Patient family says he did not hit his head.  Patient ambulatory free from injury.  Skin dry and intact.  Call bell within reach, safety maintained, will continue to monitor.

## 2017-03-15 NOTE — ED PROVIDER NOTE - OBJECTIVE STATEMENT
81yom w/ CHF, HTN, recently admitted for CHF exacerbation on 3/2 presents w/ vomiting and weakness. Pt states he ate cereal for breakfast and vomited shortly after. Denies nausea or abdominal pain, does not know why he vomited. Endorses general malaise and weakness since being discharged from the hospital. Denies chest pain, SOB, MASON, orthopnea. Pt's son at bedside states that his pedal edema has completely resolved but he feels like he might be dehydrated. No falls or trauma. No syncopal episodes. No fevers, chills, cough. 81yom w/ CHF, HTN, recently admitted for CHF exacerbation on 3/2 presents w/ vomiting and weakness. Pt states he ate cereal for breakfast and vomited shortly after. Denies nausea or abdominal pain, does not know why he vomited. Endorses general malaise and weakness since being discharged from the hospital. Denies chest pain, SOB, MASON, orthopnea. Pt's son at bedside states that his pedal edema has completely resolved but he feels like he might be dehydrated. No falls or trauma. No syncopal episodes. No fevers, chills, cough..

## 2017-03-15 NOTE — ED PROVIDER NOTE - PROGRESS NOTE DETAILS
Micheal: S/p 3 runs 10meq KCL IV. Repeat BMP pending. Likely dc home. Message left w/ Rasheed Gaines, awaiting call back. Micheal: Discussed w/ Rasheed Gaines. Recommends decreasing lasix to 40mg once daily and discontinuing metazalone. Plan discussed w/ pt and family. Will follow up with Dr. Gaines next week in office. Micheal: S/p 3 runs 10meq KCL IV. Likely dc home. Message left w/ Rasheed Gaines, awaiting call back.

## 2017-03-15 NOTE — ED PROVIDER NOTE - ATTENDING CONTRIBUTION TO CARE
Attending note:   After face to face evaluation of this patient, I concur with above noted hx, pe, and care plan for this patient. +H/o chf, just discharged from hospital with vomiting this am.    Abdomen benign; evaluation in progress

## 2017-03-17 ENCOUNTER — EMERGENCY (EMERGENCY)
Facility: HOSPITAL | Age: 82
LOS: 1 days | Discharge: HOME | End: 2017-03-17
Attending: EMERGENCY MEDICINE | Admitting: EMERGENCY MEDICINE
Payer: MEDICARE

## 2017-03-17 VITALS
RESPIRATION RATE: 16 BRPM | SYSTOLIC BLOOD PRESSURE: 139 MMHG | HEART RATE: 52 BPM | DIASTOLIC BLOOD PRESSURE: 73 MMHG | TEMPERATURE: 98 F | OXYGEN SATURATION: 100 %

## 2017-03-17 VITALS
SYSTOLIC BLOOD PRESSURE: 162 MMHG | RESPIRATION RATE: 18 BRPM | OXYGEN SATURATION: 100 % | HEART RATE: 67 BPM | TEMPERATURE: 99 F | DIASTOLIC BLOOD PRESSURE: 83 MMHG

## 2017-03-17 LAB
ALBUMIN SERPL ELPH-MCNC: 3.8 G/DL — SIGNIFICANT CHANGE UP (ref 3.3–5)
ALP SERPL-CCNC: 80 U/L — SIGNIFICANT CHANGE UP (ref 40–120)
ALT FLD-CCNC: 24 U/L — SIGNIFICANT CHANGE UP (ref 4–41)
APTT BLD: 34.5 SEC — SIGNIFICANT CHANGE UP (ref 27.5–37.4)
AST SERPL-CCNC: 27 U/L — SIGNIFICANT CHANGE UP (ref 4–40)
BASOPHILS # BLD AUTO: 0.08 K/UL — SIGNIFICANT CHANGE UP (ref 0–0.2)
BASOPHILS NFR BLD AUTO: 0.9 % — SIGNIFICANT CHANGE UP (ref 0–2)
BILIRUB SERPL-MCNC: 1.1 MG/DL — SIGNIFICANT CHANGE UP (ref 0.2–1.2)
BUN SERPL-MCNC: 59 MG/DL — HIGH (ref 7–23)
CALCIUM SERPL-MCNC: 9.2 MG/DL — SIGNIFICANT CHANGE UP (ref 8.4–10.5)
CHLORIDE SERPL-SCNC: 83 MMOL/L — LOW (ref 98–107)
CK MB BLD-MCNC: 1.52 NG/ML — SIGNIFICANT CHANGE UP (ref 1–6.6)
CK SERPL-CCNC: 119 U/L — SIGNIFICANT CHANGE UP (ref 30–200)
CO2 SERPL-SCNC: 30 MMOL/L — SIGNIFICANT CHANGE UP (ref 22–31)
CREAT SERPL-MCNC: 3.05 MG/DL — HIGH (ref 0.5–1.3)
EOSINOPHIL # BLD AUTO: 0.16 K/UL — SIGNIFICANT CHANGE UP (ref 0–0.5)
EOSINOPHIL NFR BLD AUTO: 1.9 % — SIGNIFICANT CHANGE UP (ref 0–6)
GLUCOSE SERPL-MCNC: 168 MG/DL — HIGH (ref 70–99)
HCT VFR BLD CALC: 40.8 % — SIGNIFICANT CHANGE UP (ref 39–50)
HGB BLD-MCNC: 13.3 G/DL — SIGNIFICANT CHANGE UP (ref 13–17)
IMM GRANULOCYTES NFR BLD AUTO: 0.1 % — SIGNIFICANT CHANGE UP (ref 0–1.5)
INR BLD: 1.07 — SIGNIFICANT CHANGE UP (ref 0.87–1.18)
LYMPHOCYTES # BLD AUTO: 1.65 K/UL — SIGNIFICANT CHANGE UP (ref 1–3.3)
LYMPHOCYTES # BLD AUTO: 19.1 % — SIGNIFICANT CHANGE UP (ref 13–44)
MCHC RBC-ENTMCNC: 27.9 PG — SIGNIFICANT CHANGE UP (ref 27–34)
MCHC RBC-ENTMCNC: 32.6 % — SIGNIFICANT CHANGE UP (ref 32–36)
MCV RBC AUTO: 85.7 FL — SIGNIFICANT CHANGE UP (ref 80–100)
MONOCYTES # BLD AUTO: 0.75 K/UL — SIGNIFICANT CHANGE UP (ref 0–0.9)
MONOCYTES NFR BLD AUTO: 8.7 % — SIGNIFICANT CHANGE UP (ref 2–14)
NEUTROPHILS # BLD AUTO: 5.99 K/UL — SIGNIFICANT CHANGE UP (ref 1.8–7.4)
NEUTROPHILS NFR BLD AUTO: 69.3 % — SIGNIFICANT CHANGE UP (ref 43–77)
PLATELET # BLD AUTO: 243 K/UL — SIGNIFICANT CHANGE UP (ref 150–400)
PMV BLD: 12.2 FL — SIGNIFICANT CHANGE UP (ref 7–13)
POTASSIUM SERPL-MCNC: 3.6 MMOL/L — SIGNIFICANT CHANGE UP (ref 3.5–5.3)
POTASSIUM SERPL-SCNC: 3.6 MMOL/L — SIGNIFICANT CHANGE UP (ref 3.5–5.3)
PROT SERPL-MCNC: 6.5 G/DL — SIGNIFICANT CHANGE UP (ref 6–8.3)
PROTHROM AB SERPL-ACNC: 12.2 SEC — SIGNIFICANT CHANGE UP (ref 10–13.1)
RBC # BLD: 4.76 M/UL — SIGNIFICANT CHANGE UP (ref 4.2–5.8)
RBC # FLD: 14.4 % — SIGNIFICANT CHANGE UP (ref 10.3–14.5)
SODIUM SERPL-SCNC: 131 MMOL/L — LOW (ref 135–145)
TROPONIN T SERPL-MCNC: < 0.06 NG/ML — SIGNIFICANT CHANGE UP (ref 0–0.06)
WBC # BLD: 8.64 K/UL — SIGNIFICANT CHANGE UP (ref 3.8–10.5)
WBC # FLD AUTO: 8.64 K/UL — SIGNIFICANT CHANGE UP (ref 3.8–10.5)

## 2017-03-17 PROCEDURE — 70450 CT HEAD/BRAIN W/O DYE: CPT | Mod: 26

## 2017-03-17 PROCEDURE — 71010: CPT | Mod: 26

## 2017-03-17 PROCEDURE — 93010 ELECTROCARDIOGRAM REPORT: CPT

## 2017-03-17 PROCEDURE — 99285 EMERGENCY DEPT VISIT HI MDM: CPT | Mod: 25,GC

## 2017-03-17 RX ORDER — ASPIRIN/CALCIUM CARB/MAGNESIUM 324 MG
81 TABLET ORAL ONCE
Qty: 0 | Refills: 0 | Status: DISCONTINUED | OUTPATIENT
Start: 2017-03-17 | End: 2017-03-17

## 2017-03-17 RX ORDER — CARVEDILOL PHOSPHATE 80 MG/1
12.5 CAPSULE, EXTENDED RELEASE ORAL ONCE
Qty: 0 | Refills: 0 | Status: COMPLETED | OUTPATIENT
Start: 2017-03-17 | End: 2017-03-17

## 2017-03-17 RX ORDER — SODIUM CHLORIDE 9 MG/ML
500 INJECTION INTRAMUSCULAR; INTRAVENOUS; SUBCUTANEOUS ONCE
Qty: 0 | Refills: 0 | Status: COMPLETED | OUTPATIENT
Start: 2017-03-17 | End: 2017-03-17

## 2017-03-17 RX ORDER — ASPIRIN/CALCIUM CARB/MAGNESIUM 324 MG
81 TABLET ORAL ONCE
Qty: 0 | Refills: 0 | Status: COMPLETED | OUTPATIENT
Start: 2017-03-17 | End: 2017-03-17

## 2017-03-17 RX ADMIN — CARVEDILOL PHOSPHATE 12.5 MILLIGRAM(S): 80 CAPSULE, EXTENDED RELEASE ORAL at 20:40

## 2017-03-17 RX ADMIN — SODIUM CHLORIDE 250 MILLILITER(S): 9 INJECTION INTRAMUSCULAR; INTRAVENOUS; SUBCUTANEOUS at 17:12

## 2017-03-17 RX ADMIN — Medication 81 MILLIGRAM(S): at 21:13

## 2017-03-17 NOTE — ED PROVIDER NOTE - CARE PLAN
Principal Discharge DX:	Syncope  Secondary Diagnosis:	Dehydration  Secondary Diagnosis:	Prerenal azotemia

## 2017-03-17 NOTE — ED PROVIDER NOTE - PROGRESS NOTE DETAILS
Patient improved after IVF. Will discharge home with outpatient follow up. Patient much improved after IVF. Feels better and wishes to go home. Family in agreement. Will discharge home with outpatient follow up. Told to reduce lasix from 40mg/day to 20 mg/day for newt few days and will see his PCP within 48 hours.

## 2017-03-17 NOTE — ED PROVIDER NOTE - MEDICAL DECISION MAKING DETAILS
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 Heath) office. Suspect syncopal episode from overdiuresis/dehydration versus cardiac in nature. Will give IVG, obtain labs and cardiac enzymes; EKG unchanged from prior. 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 Heath) office. Suspect syncopal episode from overdiuresis/dehydration versus cardiac in nature. Will give IVF,  obtain labs and cardiac enzymes; EKG unchanged from prior. CT head to rule out CVA

## 2017-03-17 NOTE — ED ADULT TRIAGE NOTE - CHIEF COMPLAINT QUOTE
Pt comes from doctors office after having a syncopal episode, as per son patient was talking to the doctor and passed for about 2 minutes, patient denies any symptoms prior to LOC, denies any head trauma, "slumped to side of chair" PMH CHF, DM, Htn, , pt recently discharged with abnormal electrolytes discharged with Lasix and now complaints of urinating a lot.

## 2017-03-17 NOTE — ED ADULT NURSE NOTE - OBJECTIVE STATEMENT
Pt sent from pmd office via ems for witnessed syncope x 1 minutes prior to ED arrival.  On arrival, pt alert, awake, oriented x3.  H/o htn, chf.  EKG - First degree AV block.  Pt appears to be weak.   Breathing is shallow, spontaneously breathing 100% O2 sat.  IV accessed.  Labs sent.  Seen by MD Gomez.  500cc of NS infusing.  Placed on cardiac monitoring. Will continue to monitor closely.

## 2017-03-17 NOTE — ED PROVIDER NOTE - OBJECTIVE STATEMENT
Patient is a 80 y/o M PMH HTN, CHF with mildly reduced EF, CKD who presents with syncopal episode from doctor's office Patient is a 82 y/o M PMH HTN, CHF with mildly reduced EF, CKD who presents with syncopal episode from doctor's office. Patient was brought to doctor's office by his daughter, after sitting down in a chair, was noted to become altered, slumped over in chair for two minutes. Patient does not have any recollection of this episode. Patient was then brought to ED. As per daughter, patient has been more "slow" in the last several days. Patient denies fevers, chills, night sweats, cough, chest pain, SOB, abdominal pain, nausea, vomiting, diarrhea, constipation, dysuria, increased urinary frequency. Denies dizziness/lightedness with sitting up or standing.

## 2017-03-17 NOTE — ED PROVIDER NOTE - ATTENDING CONTRIBUTION TO CARE
82 y/o M PMH HTN, CHF with mildly reduced EF, CKD who presents with syncopal episode from doctor's office. Patient was brought to doctor's office by his daughter, after sitting down in a chair, was noted to become altered, slumped over in chair for two minutes. Patient does not have any recollection of this episode. Patient was then brought to ED. As per daughter, patient has been more "slow" in the last several days. Patient denies fevers, chills, night sweats, cough, chest pain, SOB, abdominal pain, nausea, vomiting, diarrhea, constipation, dysuria, increased urinary frequency. Denies dizziness/lightedness with sitting up or standing. On exam weak and dehydrated. awake and oriented. JVD not elevated lungs clear heart sounds normal abdomen non tender ext no swelling. Likely overdiuresed and dehydrated. Plan: EKG labs UA CXR careful gentle hydration.

## 2017-03-22 ENCOUNTER — OUTPATIENT (OUTPATIENT)
Dept: OUTPATIENT SERVICES | Facility: HOSPITAL | Age: 82
LOS: 1 days | End: 2017-03-22

## 2017-03-22 ENCOUNTER — APPOINTMENT (OUTPATIENT)
Dept: CARDIOLOGY | Facility: HOSPITAL | Age: 82
End: 2017-03-22

## 2017-03-22 VITALS
DIASTOLIC BLOOD PRESSURE: 82 MMHG | BODY MASS INDEX: 31.4 KG/M2 | OXYGEN SATURATION: 97 % | HEIGHT: 69 IN | HEART RATE: 58 BPM | WEIGHT: 212 LBS | SYSTOLIC BLOOD PRESSURE: 143 MMHG

## 2017-03-23 DIAGNOSIS — I50.32 CHRONIC DIASTOLIC (CONGESTIVE) HEART FAILURE: ICD-10-CM

## 2017-03-30 ENCOUNTER — RX RENEWAL (OUTPATIENT)
Age: 82
End: 2017-03-30

## 2017-04-12 ENCOUNTER — APPOINTMENT (OUTPATIENT)
Dept: CARDIOLOGY | Facility: HOSPITAL | Age: 82
End: 2017-04-12

## 2017-04-12 VITALS
HEART RATE: 85 BPM | WEIGHT: 206 LBS | OXYGEN SATURATION: 98 % | SYSTOLIC BLOOD PRESSURE: 153 MMHG | DIASTOLIC BLOOD PRESSURE: 75 MMHG | BODY MASS INDEX: 30.42 KG/M2 | RESPIRATION RATE: 16 BRPM

## 2017-04-12 RX ORDER — ISOSORBIDE MONONITRATE 30 MG/1
30 TABLET, EXTENDED RELEASE ORAL DAILY
Qty: 30 | Refills: 5 | Status: DISCONTINUED | COMMUNITY
Start: 2017-03-22 | End: 2017-04-12

## 2017-04-12 RX ORDER — POTASSIUM CHLORIDE 1500 MG/1
20 TABLET, EXTENDED RELEASE ORAL DAILY
Qty: 30 | Refills: 5 | Status: DISCONTINUED | COMMUNITY
Start: 2017-03-22 | End: 2017-04-12

## 2017-04-12 RX ORDER — CARVEDILOL 12.5 MG/1
12.5 TABLET, FILM COATED ORAL TWICE DAILY
Qty: 60 | Refills: 5 | Status: DISCONTINUED | COMMUNITY
Start: 2017-03-22 | End: 2017-04-12

## 2017-06-28 NOTE — ED PROVIDER NOTE - NS ED NOTE AC HIGH RISK COUNTRIES
No Airway patent, nasal mucosa clear, mouth with normal mucosa. Throat has no vesicles, no oropharyngeal exudates and uvula is midline. Clear tympanic membranes bilaterally.

## 2017-08-16 ENCOUNTER — APPOINTMENT (OUTPATIENT)
Dept: CARDIOLOGY | Facility: HOSPITAL | Age: 82
End: 2017-08-16

## 2017-08-16 ENCOUNTER — OUTPATIENT (OUTPATIENT)
Dept: OUTPATIENT SERVICES | Facility: HOSPITAL | Age: 82
LOS: 1 days | End: 2017-08-16

## 2017-08-16 VITALS
HEART RATE: 70 BPM | OXYGEN SATURATION: 98 % | RESPIRATION RATE: 17 BRPM | DIASTOLIC BLOOD PRESSURE: 84 MMHG | BODY MASS INDEX: 30.86 KG/M2 | WEIGHT: 209 LBS | SYSTOLIC BLOOD PRESSURE: 185 MMHG

## 2017-08-16 VITALS — SYSTOLIC BLOOD PRESSURE: 172 MMHG | DIASTOLIC BLOOD PRESSURE: 85 MMHG

## 2017-08-16 RX ORDER — CARVEDILOL 6.25 MG/1
6.25 TABLET, FILM COATED ORAL TWICE DAILY
Qty: 60 | Refills: 5 | Status: DISCONTINUED | COMMUNITY
Start: 2017-08-16 | End: 2017-08-16

## 2017-08-16 RX ORDER — AMLODIPINE BESYLATE 10 MG/1
10 TABLET ORAL DAILY
Qty: 30 | Refills: 5 | Status: DISCONTINUED | COMMUNITY
Start: 2017-04-12 | End: 2017-08-16

## 2017-08-16 RX ORDER — FUROSEMIDE 40 MG/1
40 TABLET ORAL
Qty: 30 | Refills: 5 | Status: DISCONTINUED | COMMUNITY
Start: 2017-03-22 | End: 2017-08-16

## 2017-08-18 DIAGNOSIS — I10 ESSENTIAL (PRIMARY) HYPERTENSION: ICD-10-CM

## 2017-08-30 ENCOUNTER — OUTPATIENT (OUTPATIENT)
Dept: OUTPATIENT SERVICES | Facility: HOSPITAL | Age: 82
LOS: 1 days | End: 2017-08-30

## 2017-08-30 ENCOUNTER — APPOINTMENT (OUTPATIENT)
Dept: CARDIOLOGY | Facility: HOSPITAL | Age: 82
End: 2017-08-30

## 2017-08-30 VITALS
HEART RATE: 81 BPM | DIASTOLIC BLOOD PRESSURE: 72 MMHG | WEIGHT: 211 LBS | OXYGEN SATURATION: 98 % | RESPIRATION RATE: 16 BRPM | BODY MASS INDEX: 31.16 KG/M2 | SYSTOLIC BLOOD PRESSURE: 166 MMHG

## 2017-08-30 VITALS — DIASTOLIC BLOOD PRESSURE: 78 MMHG | SYSTOLIC BLOOD PRESSURE: 142 MMHG

## 2017-08-31 DIAGNOSIS — I10 ESSENTIAL (PRIMARY) HYPERTENSION: ICD-10-CM

## 2017-09-12 DIAGNOSIS — I10 ESSENTIAL (PRIMARY) HYPERTENSION: ICD-10-CM

## 2017-10-04 ENCOUNTER — OUTPATIENT (OUTPATIENT)
Dept: OUTPATIENT SERVICES | Facility: HOSPITAL | Age: 82
LOS: 1 days | End: 2017-10-04

## 2017-10-04 ENCOUNTER — APPOINTMENT (OUTPATIENT)
Dept: CARDIOLOGY | Facility: HOSPITAL | Age: 82
End: 2017-10-04

## 2017-10-04 VITALS — SYSTOLIC BLOOD PRESSURE: 131 MMHG | DIASTOLIC BLOOD PRESSURE: 79 MMHG

## 2017-10-04 VITALS
WEIGHT: 208 LBS | SYSTOLIC BLOOD PRESSURE: 153 MMHG | DIASTOLIC BLOOD PRESSURE: 72 MMHG | RESPIRATION RATE: 16 BRPM | HEART RATE: 78 BPM | BODY MASS INDEX: 30.72 KG/M2 | OXYGEN SATURATION: 99 %

## 2017-10-05 DIAGNOSIS — I10 ESSENTIAL (PRIMARY) HYPERTENSION: ICD-10-CM

## 2018-01-18 NOTE — PATIENT PROFILE ADULT. - AS SC BRADEN SENSORY
Patient Education     Spider Bite, Local Reaction    The venom from a spider bite can cause a local skin reaction. This often causes local redness, itching, and swelling. This reaction will fade over a few hours to a few days. A spider bite can become infected, so watch for the signs listed below. Sometimes it is hard to tell the difference between a local reaction to the insect bite/sting and an early infection, so antibiotics may be started.  Home care  The following guidelines will help you care for your wound at home:  · If itching is a problem, avoid anything that heats up your skin (hot showers/baths, direct sunlight) since this will make itching worse.  · During the first 24 hours, you may apply an ice pack (ice cubes in a plastic bag, wrapped in a towel) for 20 minutes at a time every 1 to 2 hours to reduce pain and swelling. An over-the-counter spray or cream containing benzocaine may also be used for pain. Over-the-counter topical agents containing diphenhydramine or hydrocortisone may help with excessive itching. Remember to review the medicine instructions for any allergies.  · If the wound becomes red, wash the area with soap and water daily.  Apply an antibiotic cream or ointment 3 times a day.  · If oral antibiotics have been prescribed, be sure to take them as directed until they are all finished.  Follow-up care  Follow up with your doctor, or as advised by our staff.  When to seek medical care  Get prompt medical attention if any of the following occur:  · Spreading areas of itching, redness, or swelling  · Pain or swelling that gets worse  · Fever of 100.4ºF (38ºC) or higher, or as directed by your health care provider  · Colored fluid draining from the wound  · New or worse swelling in the face, eyelids, lips, mouth, throat, or tongue  · Hard time swallowing or breathing (if you are having trouble breathing, call 911)  · Dizziness, weakness, or fainting  · You get a skin ulcer  · A red streak in  the skin leading away from the wound  · You still have symptoms after 3 days  · Generalized rash, fever, or joint pain starting 1 to 2 weeks after treatment  © 1421-3293 [employer]. [address] [place] [address], [place] [address]. All rights reserved. This information is not intended as a substitute for professional medical care. Always follow your healthcare professional's instructions.            (4) no impairment

## 2018-01-31 ENCOUNTER — APPOINTMENT (OUTPATIENT)
Dept: CARDIOLOGY | Facility: HOSPITAL | Age: 83
End: 2018-01-31

## 2018-02-21 ENCOUNTER — OUTPATIENT (OUTPATIENT)
Dept: OUTPATIENT SERVICES | Facility: HOSPITAL | Age: 83
LOS: 1 days | End: 2018-02-21

## 2018-02-21 ENCOUNTER — APPOINTMENT (OUTPATIENT)
Dept: CARDIOLOGY | Facility: HOSPITAL | Age: 83
End: 2018-02-21

## 2018-02-21 VITALS
WEIGHT: 204 LBS | DIASTOLIC BLOOD PRESSURE: 76 MMHG | RESPIRATION RATE: 16 BRPM | HEART RATE: 70 BPM | SYSTOLIC BLOOD PRESSURE: 172 MMHG | OXYGEN SATURATION: 98 % | BODY MASS INDEX: 30.13 KG/M2

## 2018-02-22 DIAGNOSIS — I10 ESSENTIAL (PRIMARY) HYPERTENSION: ICD-10-CM

## 2018-04-24 ENCOUNTER — APPOINTMENT (OUTPATIENT)
Dept: CARDIOLOGY | Facility: HOSPITAL | Age: 83
End: 2018-04-24

## 2018-04-25 ENCOUNTER — APPOINTMENT (OUTPATIENT)
Dept: CARDIOLOGY | Facility: HOSPITAL | Age: 83
End: 2018-04-25

## 2018-04-25 ENCOUNTER — OUTPATIENT (OUTPATIENT)
Dept: OUTPATIENT SERVICES | Facility: HOSPITAL | Age: 83
LOS: 1 days | End: 2018-04-25

## 2018-04-25 VITALS
BODY MASS INDEX: 30.72 KG/M2 | OXYGEN SATURATION: 98 % | WEIGHT: 208 LBS | RESPIRATION RATE: 14 BRPM | HEART RATE: 66 BPM | DIASTOLIC BLOOD PRESSURE: 80 MMHG | SYSTOLIC BLOOD PRESSURE: 164 MMHG

## 2018-04-26 DIAGNOSIS — I10 ESSENTIAL (PRIMARY) HYPERTENSION: ICD-10-CM

## 2018-10-26 PROBLEM — I50.9 HEART FAILURE, UNSPECIFIED: Chronic | Status: ACTIVE | Noted: 2017-03-15

## 2018-12-12 ENCOUNTER — APPOINTMENT (OUTPATIENT)
Dept: CARDIOLOGY | Facility: HOSPITAL | Age: 83
End: 2018-12-12

## 2018-12-12 ENCOUNTER — OUTPATIENT (OUTPATIENT)
Dept: OUTPATIENT SERVICES | Facility: HOSPITAL | Age: 83
LOS: 1 days | End: 2018-12-12

## 2018-12-12 ENCOUNTER — NON-APPOINTMENT (OUTPATIENT)
Age: 83
End: 2018-12-12

## 2018-12-12 VITALS
DIASTOLIC BLOOD PRESSURE: 65 MMHG | HEIGHT: 69 IN | OXYGEN SATURATION: 98 % | SYSTOLIC BLOOD PRESSURE: 173 MMHG | HEART RATE: 81 BPM

## 2018-12-12 VITALS — WEIGHT: 208 LBS | BODY MASS INDEX: 30.72 KG/M2

## 2018-12-12 VITALS — SYSTOLIC BLOOD PRESSURE: 158 MMHG | DIASTOLIC BLOOD PRESSURE: 60 MMHG

## 2018-12-12 NOTE — REASON FOR VISIT
[Follow-Up - Clinic] : a clinic follow-up of [Heart Failure] : congestive heart failure [Hypertension] : hypertension [Family Member] : family member

## 2018-12-12 NOTE — PHYSICAL EXAM
[General Appearance - Well Developed] : well developed [Normal Appearance] : normal appearance [Well Groomed] : well groomed [General Appearance - Well Nourished] : well nourished [No Deformities] : no deformities [General Appearance - In No Acute Distress] : no acute distress [Normal Conjunctiva] : the conjunctiva exhibited no abnormalities [Eyelids - No Xanthelasma] : the eyelids demonstrated no xanthelasmas [Normal Oral Mucosa] : normal oral mucosa [No Oral Pallor] : no oral pallor [No Oral Cyanosis] : no oral cyanosis [Normal Jugular Venous A Waves Present] : normal jugular venous A waves present [Normal Jugular Venous V Waves Present] : normal jugular venous V waves present [No Jugular Venous Alston A Waves] : no jugular venous alston A waves [Respiration, Rhythm And Depth] : normal respiratory rhythm and effort [Exaggerated Use Of Accessory Muscles For Inspiration] : no accessory muscle use [Auscultation Breath Sounds / Voice Sounds] : lungs were clear to auscultation bilaterally [Heart Rate And Rhythm] : heart rate and rhythm were normal [Heart Sounds] : normal S1 and S2 [Murmurs] : no murmurs present [Edema] : no peripheral edema present [Abdomen Soft] : soft [Abdomen Tenderness] : non-tender [Abdomen Mass (___ Cm)] : no abdominal mass palpated [Abnormal Walk] : normal gait [Gait - Sufficient For Exercise Testing] : the gait was sufficient for exercise testing [Nail Clubbing] : no clubbing of the fingernails [Cyanosis, Localized] : no localized cyanosis [Petechial Hemorrhages (___cm)] : no petechial hemorrhages [Skin Color & Pigmentation] : normal skin color and pigmentation [] : no rash [No Venous Stasis] : no venous stasis [Skin Lesions] : no skin lesions [No Skin Ulcers] : no skin ulcer [No Xanthoma] : no  xanthoma was observed [Oriented To Time, Place, And Person] : oriented to person, place, and time [Affect] : the affect was normal [Mood] : the mood was normal [No Anxiety] : not feeling anxious

## 2018-12-13 DIAGNOSIS — I10 ESSENTIAL (PRIMARY) HYPERTENSION: ICD-10-CM

## 2018-12-14 NOTE — HISTORY OF PRESENT ILLNESS
[FreeTextEntry1] : 83M w/ PMH of HTN, CKD, dementia, and HFpEF. He comes in today for follow-up.\par \par The patient states that he takes his BP 3 times a day at home. His SBP is usually in the 140s and sometimes in the 130s. On rare occasions it is in the 150s. \par \par  He is presently taking Torsemide 10mg OD, Hydralazine 100mg TID and Nifedipine-XL 90mg OD. \par \par He denies any dizziness, shortness of breath, PND or orthopnea. He can walk 5 blocks on level ground and go up 3 flight of stairs without issues. He urinates 3-5x/day. He now eats home-cooked meals and has avoided salty foods. No recent ED visits or hospitalizations. He used to work as a  and is a nevere smoker.

## 2018-12-14 NOTE — DISCUSSION/SUMMARY
[FreeTextEntry1] : 83/M with PMH of longstanding HTN, CKD, dementia and recently diagnosed CHF with preserved EF. He comes in today for follow-up:\par \par #Essential hypertension\par - continue Hydralazine 100mg TID and Nifedipine-XL 90mg OD\par - continue Torsemide 10mg OD\par - BP elevated in office today (158/60), however, pt states that BP is lower at home. \par - Pt advised to record BPs at home and bring them into next visit. \par - If BP is elevated at home >140 then will add on another antihypertensive agent. (current meds already at high doses)\par \par #CHF, chronic, diastolic/border line preserved EF (50%) likely 2/2 HTN\par - fluid restriction (2L/day) and salt restriction (<2g/day)\par - continue Hydralazine\par - c/w torsemide. \par - Pt to bring in out pt labs at next visit. If BP is elevated and Cr is stable will start ACEi\par \par RTC in 3 months. \par \par

## 2019-01-15 NOTE — ED PROVIDER NOTE - CONTEXT
ACE wrap applied R knee, PMS intact  Pt instructed on aftercare and RICE acronym, acknowledged understanding       Sabrina Colón RN  01/15/19 [id number] fainted/in doctor's office in doctor's office

## 2019-04-24 ENCOUNTER — APPOINTMENT (OUTPATIENT)
Dept: CARDIOLOGY | Facility: HOSPITAL | Age: 84
End: 2019-04-24

## 2019-04-24 VITALS
DIASTOLIC BLOOD PRESSURE: 79 MMHG | SYSTOLIC BLOOD PRESSURE: 180 MMHG | OXYGEN SATURATION: 97 % | HEIGHT: 69 IN | RESPIRATION RATE: 18 BRPM | TEMPERATURE: 98 F | HEART RATE: 76 BPM | WEIGHT: 217 LBS | BODY MASS INDEX: 32.14 KG/M2

## 2019-04-24 RX ORDER — ASPIRIN 81 MG/1
81 TABLET, CHEWABLE ORAL DAILY
Qty: 30 | Refills: 5 | Status: ACTIVE | COMMUNITY
Start: 2017-03-22 | End: 1900-01-01

## 2019-04-24 RX ORDER — NIFEDIPINE 90 MG/1
90 TABLET, EXTENDED RELEASE ORAL DAILY
Qty: 30 | Refills: 5 | Status: ACTIVE | COMMUNITY
Start: 2017-08-16 | End: 1900-01-01

## 2019-04-24 NOTE — HISTORY OF PRESENT ILLNESS
[FreeTextEntry1] : 83M w/ PMH of HTN, CKD, mild dementia, and HFpEF. He comes in today for follow-up.\par \par  He is presently taking Torsemide 10mg OD, Hydralazine 100mg TID and Nifedipine-XL 90mg OD. \par \par He denies any dizziness, shortness of breath, PND or orthopnea. He can walk 5 blocks on level ground and go up 3 flight of stairs without issues. He urinates 3-5x/day. He now eats home-cooked meals and has avoided salty foods. No recent ED visits or hospitalizations. He used to work as a  and is a never smoker.

## 2019-04-24 NOTE — DISCUSSION/SUMMARY
[FreeTextEntry1] : 83/M with PMH of longstanding HTN, CKD, dementia and recently diagnosed CHF with preserved EF. He comes in today for follow-up:\par \par #Essential hypertension\par - currently on Hydralazine 100mg TID and Nifedipine-XL 90mg OD\par - BP in office elevated two visits in a row. \par - Pt was taking his BP at home, and home BP has also been elevated recently. \par - Out pt labs reviewed, slightly worse Cr, otherwise unremarkable. \par - At this time will increase hydralazine to 150 mg TID. \par - continue Torsemide 10mg OD\par \par #CHF, chronic, diastolic/border line preserved EF (50%) likely 2/2 HTN\par - fluid restriction (2L/day) and salt restriction (<2g/day)\par - continue Hydralazine\par - c/w torsemide. \par \par RTC in 2 months \par

## 2019-04-24 NOTE — PHYSICAL EXAM
[General Appearance - Well Developed] : well developed [Normal Appearance] : normal appearance [Well Groomed] : well groomed [No Deformities] : no deformities [General Appearance - Well Nourished] : well nourished [General Appearance - In No Acute Distress] : no acute distress [Normal Conjunctiva] : the conjunctiva exhibited no abnormalities [Eyelids - No Xanthelasma] : the eyelids demonstrated no xanthelasmas [Normal Oral Mucosa] : normal oral mucosa [No Oral Pallor] : no oral pallor [No Oral Cyanosis] : no oral cyanosis [Normal Jugular Venous A Waves Present] : normal jugular venous A waves present [Normal Jugular Venous V Waves Present] : normal jugular venous V waves present [No Jugular Venous Alston A Waves] : no jugular venous alston A waves [Exaggerated Use Of Accessory Muscles For Inspiration] : no accessory muscle use [Respiration, Rhythm And Depth] : normal respiratory rhythm and effort [Auscultation Breath Sounds / Voice Sounds] : lungs were clear to auscultation bilaterally [Heart Rate And Rhythm] : heart rate and rhythm were normal [Heart Sounds] : normal S1 and S2 [Murmurs] : no murmurs present [Edema] : no peripheral edema present [Abdomen Soft] : soft [Abdomen Tenderness] : non-tender [Abdomen Mass (___ Cm)] : no abdominal mass palpated [Abnormal Walk] : normal gait [Gait - Sufficient For Exercise Testing] : the gait was sufficient for exercise testing [Nail Clubbing] : no clubbing of the fingernails [Cyanosis, Localized] : no localized cyanosis [Petechial Hemorrhages (___cm)] : no petechial hemorrhages [Skin Color & Pigmentation] : normal skin color and pigmentation [] : no rash [No Venous Stasis] : no venous stasis [Skin Lesions] : no skin lesions [No Skin Ulcers] : no skin ulcer [No Xanthoma] : no  xanthoma was observed [Oriented To Time, Place, And Person] : oriented to person, place, and time [Affect] : the affect was normal [No Anxiety] : not feeling anxious [Mood] : the mood was normal

## 2019-07-17 ENCOUNTER — APPOINTMENT (OUTPATIENT)
Dept: CARDIOLOGY | Facility: HOSPITAL | Age: 84
End: 2019-07-17

## 2019-09-04 ENCOUNTER — NON-APPOINTMENT (OUTPATIENT)
Age: 84
End: 2019-09-04

## 2019-09-04 ENCOUNTER — APPOINTMENT (OUTPATIENT)
Dept: CARDIOLOGY | Facility: HOSPITAL | Age: 84
End: 2019-09-04

## 2019-09-04 VITALS
SYSTOLIC BLOOD PRESSURE: 137 MMHG | WEIGHT: 205 LBS | OXYGEN SATURATION: 97 % | HEIGHT: 69 IN | DIASTOLIC BLOOD PRESSURE: 73 MMHG | RESPIRATION RATE: 16 BRPM | HEART RATE: 78 BPM | BODY MASS INDEX: 30.36 KG/M2

## 2019-09-04 NOTE — DISCUSSION/SUMMARY
[FreeTextEntry1] : 83/M with PMH of longstanding HTN, CKD, dementia and recently diagnosed CHF with preserved EF. He comes in today for follow-up:\par \par #Essential hypertension\par - currently on Hydralazine 150mg TID and Nifedipine-XL 90mg OD\par - BP much improved. \par - Out pt labs reviewed, slightly worse Cr, otherwise unremarkable. \par - continue Torsemide 10mg OD\par \par #CHF, chronic, diastolic/border line preserved EF (50%) likely 2/2 HTN\par - fluid restriction (2L/day) and salt restriction (<2g/day)\par - continue Hydralazine\par - c/w torsemide. \par \par RTC in 6 months \par

## 2019-09-04 NOTE — HISTORY OF PRESENT ILLNESS
[FreeTextEntry1] : 84M w/ PMH of HTN, CKD, mild dementia, and HFpEF. He comes in today for follow-up.\par \par  He is presently taking Torsemide 10 mg OD, Hydralazine 150mg TID and Nifedipine-XL 90mg OD. \par \par He denies any dizziness, shortness of breath, PND or orthopnea. He can walk 5 blocks on level ground and go up 3 flight of stairs without issues. He urinates 3-5x/day. He now eats home-cooked meals and has avoided salty foods. No recent ED visits or hospitalizations. He used to work as a  and is a never smoker.

## 2019-09-04 NOTE — PHYSICAL EXAM
[General Appearance - Well Developed] : well developed [Normal Appearance] : normal appearance [Well Groomed] : well groomed [General Appearance - Well Nourished] : well nourished [No Deformities] : no deformities [General Appearance - In No Acute Distress] : no acute distress [Normal Conjunctiva] : the conjunctiva exhibited no abnormalities [Eyelids - No Xanthelasma] : the eyelids demonstrated no xanthelasmas [Normal Oral Mucosa] : normal oral mucosa [No Oral Cyanosis] : no oral cyanosis [No Oral Pallor] : no oral pallor [Normal Jugular Venous A Waves Present] : normal jugular venous A waves present [Normal Jugular Venous V Waves Present] : normal jugular venous V waves present [No Jugular Venous Alston A Waves] : no jugular venous alston A waves [Respiration, Rhythm And Depth] : normal respiratory rhythm and effort [Exaggerated Use Of Accessory Muscles For Inspiration] : no accessory muscle use [Auscultation Breath Sounds / Voice Sounds] : lungs were clear to auscultation bilaterally [Heart Rate And Rhythm] : heart rate and rhythm were normal [Heart Sounds] : normal S1 and S2 [Murmurs] : no murmurs present [Edema] : no peripheral edema present [Abdomen Soft] : soft [Abdomen Tenderness] : non-tender [Abdomen Mass (___ Cm)] : no abdominal mass palpated [Abnormal Walk] : normal gait [Gait - Sufficient For Exercise Testing] : the gait was sufficient for exercise testing [Nail Clubbing] : no clubbing of the fingernails [Cyanosis, Localized] : no localized cyanosis [Petechial Hemorrhages (___cm)] : no petechial hemorrhages [Skin Color & Pigmentation] : normal skin color and pigmentation [] : no rash [No Venous Stasis] : no venous stasis [Skin Lesions] : no skin lesions [No Skin Ulcers] : no skin ulcer [No Xanthoma] : no  xanthoma was observed [Oriented To Time, Place, And Person] : oriented to person, place, and time [Affect] : the affect was normal [Mood] : the mood was normal [No Anxiety] : not feeling anxious

## 2019-10-24 ENCOUNTER — INPATIENT (INPATIENT)
Facility: HOSPITAL | Age: 84
LOS: 1 days | Discharge: HOME | End: 2019-10-26
Attending: INTERNAL MEDICINE | Admitting: INTERNAL MEDICINE
Payer: MEDICARE

## 2019-10-24 VITALS
OXYGEN SATURATION: 93 % | RESPIRATION RATE: 19 BRPM | SYSTOLIC BLOOD PRESSURE: 229 MMHG | HEART RATE: 76 BPM | WEIGHT: 210.1 LBS | TEMPERATURE: 98 F | DIASTOLIC BLOOD PRESSURE: 106 MMHG

## 2019-10-24 DIAGNOSIS — E78.5 HYPERLIPIDEMIA, UNSPECIFIED: ICD-10-CM

## 2019-10-24 DIAGNOSIS — H40.9 UNSPECIFIED GLAUCOMA: ICD-10-CM

## 2019-10-24 DIAGNOSIS — Z29.9 ENCOUNTER FOR PROPHYLACTIC MEASURES, UNSPECIFIED: ICD-10-CM

## 2019-10-24 DIAGNOSIS — I10 ESSENTIAL (PRIMARY) HYPERTENSION: ICD-10-CM

## 2019-10-24 DIAGNOSIS — I63.9 CEREBRAL INFARCTION, UNSPECIFIED: ICD-10-CM

## 2019-10-24 LAB
ALBUMIN SERPL ELPH-MCNC: 4 G/DL — SIGNIFICANT CHANGE UP (ref 3.3–5)
ALP SERPL-CCNC: 92 U/L — SIGNIFICANT CHANGE UP (ref 40–120)
ALT FLD-CCNC: 11 U/L — LOW (ref 12–78)
ANION GAP SERPL CALC-SCNC: 12 MMOL/L — SIGNIFICANT CHANGE UP (ref 5–17)
APPEARANCE UR: CLEAR — SIGNIFICANT CHANGE UP
APTT BLD: 27.7 SEC — LOW (ref 28.5–37)
AST SERPL-CCNC: 45 U/L — HIGH (ref 15–37)
BACTERIA # UR AUTO: ABNORMAL
BASOPHILS # BLD AUTO: 0.05 K/UL — SIGNIFICANT CHANGE UP (ref 0–0.2)
BASOPHILS NFR BLD AUTO: 0.6 % — SIGNIFICANT CHANGE UP (ref 0–2)
BILIRUB SERPL-MCNC: 1.3 MG/DL — HIGH (ref 0.2–1.2)
BILIRUB UR-MCNC: NEGATIVE — SIGNIFICANT CHANGE UP
BUN SERPL-MCNC: 16 MG/DL — SIGNIFICANT CHANGE UP (ref 7–23)
CALCIUM SERPL-MCNC: 8.7 MG/DL — SIGNIFICANT CHANGE UP (ref 8.5–10.1)
CHLORIDE SERPL-SCNC: 99 MMOL/L — SIGNIFICANT CHANGE UP (ref 96–108)
CO2 SERPL-SCNC: 24 MMOL/L — SIGNIFICANT CHANGE UP (ref 22–31)
COLOR SPEC: YELLOW — SIGNIFICANT CHANGE UP
CREAT SERPL-MCNC: 2.47 MG/DL — HIGH (ref 0.5–1.3)
DIFF PNL FLD: NEGATIVE — SIGNIFICANT CHANGE UP
EOSINOPHIL # BLD AUTO: 0.08 K/UL — SIGNIFICANT CHANGE UP (ref 0–0.5)
EOSINOPHIL NFR BLD AUTO: 0.9 % — SIGNIFICANT CHANGE UP (ref 0–6)
GLUCOSE BLDC GLUCOMTR-MCNC: 118 MG/DL — HIGH (ref 70–99)
GLUCOSE SERPL-MCNC: 118 MG/DL — HIGH (ref 70–99)
GLUCOSE UR QL: NEGATIVE MG/DL — SIGNIFICANT CHANGE UP
HCT VFR BLD CALC: 36.9 % — LOW (ref 39–50)
HGB BLD-MCNC: 11.7 G/DL — LOW (ref 13–17)
IMM GRANULOCYTES NFR BLD AUTO: 0.2 % — SIGNIFICANT CHANGE UP (ref 0–1.5)
INR BLD: 1.03 RATIO — SIGNIFICANT CHANGE UP (ref 0.88–1.16)
KETONES UR-MCNC: NEGATIVE — SIGNIFICANT CHANGE UP
LEUKOCYTE ESTERASE UR-ACNC: NEGATIVE — SIGNIFICANT CHANGE UP
LYMPHOCYTES # BLD AUTO: 0.96 K/UL — LOW (ref 1–3.3)
LYMPHOCYTES # BLD AUTO: 11.1 % — LOW (ref 13–44)
MCHC RBC-ENTMCNC: 26.7 PG — LOW (ref 27–34)
MCHC RBC-ENTMCNC: 31.7 GM/DL — LOW (ref 32–36)
MCV RBC AUTO: 84.2 FL — SIGNIFICANT CHANGE UP (ref 80–100)
MONOCYTES # BLD AUTO: 1.04 K/UL — HIGH (ref 0–0.9)
MONOCYTES NFR BLD AUTO: 12 % — SIGNIFICANT CHANGE UP (ref 2–14)
NEUTROPHILS # BLD AUTO: 6.5 K/UL — SIGNIFICANT CHANGE UP (ref 1.8–7.4)
NEUTROPHILS NFR BLD AUTO: 75.2 % — SIGNIFICANT CHANGE UP (ref 43–77)
NITRITE UR-MCNC: NEGATIVE — SIGNIFICANT CHANGE UP
NRBC # BLD: 0 /100 WBCS — SIGNIFICANT CHANGE UP (ref 0–0)
PH UR: 8 — SIGNIFICANT CHANGE UP (ref 5–8)
PLATELET # BLD AUTO: 294 K/UL — SIGNIFICANT CHANGE UP (ref 150–400)
POTASSIUM SERPL-MCNC: 4 MMOL/L — SIGNIFICANT CHANGE UP (ref 3.5–5.3)
POTASSIUM SERPL-SCNC: 4 MMOL/L — SIGNIFICANT CHANGE UP (ref 3.5–5.3)
PROT SERPL-MCNC: 7.7 GM/DL — SIGNIFICANT CHANGE UP (ref 6–8.3)
PROT UR-MCNC: 15 MG/DL
PROTHROM AB SERPL-ACNC: 11.6 SEC — SIGNIFICANT CHANGE UP (ref 10–12.9)
RBC # BLD: 4.38 M/UL — SIGNIFICANT CHANGE UP (ref 4.2–5.8)
RBC # FLD: 15.5 % — HIGH (ref 10.3–14.5)
RBC CASTS # UR COMP ASSIST: SIGNIFICANT CHANGE UP /HPF (ref 0–4)
SODIUM SERPL-SCNC: 135 MMOL/L — SIGNIFICANT CHANGE UP (ref 135–145)
SP GR SPEC: 1.01 — SIGNIFICANT CHANGE UP (ref 1.01–1.02)
TROPONIN I SERPL-MCNC: 0.02 NG/ML — SIGNIFICANT CHANGE UP (ref 0.01–0.04)
UROBILINOGEN FLD QL: NEGATIVE MG/DL — SIGNIFICANT CHANGE UP
WBC # BLD: 8.65 K/UL — SIGNIFICANT CHANGE UP (ref 3.8–10.5)
WBC # FLD AUTO: 8.65 K/UL — SIGNIFICANT CHANGE UP (ref 3.8–10.5)
WBC UR QL: SIGNIFICANT CHANGE UP

## 2019-10-24 PROCEDURE — 93010 ELECTROCARDIOGRAM REPORT: CPT

## 2019-10-24 PROCEDURE — 99222 1ST HOSP IP/OBS MODERATE 55: CPT

## 2019-10-24 PROCEDURE — 70496 CT ANGIOGRAPHY HEAD: CPT | Mod: 26

## 2019-10-24 PROCEDURE — 99285 EMERGENCY DEPT VISIT HI MDM: CPT

## 2019-10-24 PROCEDURE — 70498 CT ANGIOGRAPHY NECK: CPT | Mod: 26

## 2019-10-24 RX ORDER — BRIMONIDINE TARTRATE 2 MG/MG
1 SOLUTION/ DROPS OPHTHALMIC
Refills: 0 | Status: DISCONTINUED | OUTPATIENT
Start: 2019-10-24 | End: 2019-10-26

## 2019-10-24 RX ORDER — HEPARIN SODIUM 5000 [USP'U]/ML
5000 INJECTION INTRAVENOUS; SUBCUTANEOUS EVERY 8 HOURS
Refills: 0 | Status: DISCONTINUED | OUTPATIENT
Start: 2019-10-24 | End: 2019-10-26

## 2019-10-24 RX ORDER — LATANOPROST 0.05 MG/ML
1 SOLUTION/ DROPS OPHTHALMIC; TOPICAL AT BEDTIME
Refills: 0 | Status: DISCONTINUED | OUTPATIENT
Start: 2019-10-24 | End: 2019-10-26

## 2019-10-24 RX ORDER — ASPIRIN/CALCIUM CARB/MAGNESIUM 324 MG
325 TABLET ORAL ONCE
Refills: 0 | Status: COMPLETED | OUTPATIENT
Start: 2019-10-24 | End: 2019-10-24

## 2019-10-24 RX ORDER — DORZOLAMIDE HYDROCHLORIDE 20 MG/ML
1 SOLUTION/ DROPS OPHTHALMIC THREE TIMES A DAY
Refills: 0 | Status: DISCONTINUED | OUTPATIENT
Start: 2019-10-24 | End: 2019-10-26

## 2019-10-24 RX ORDER — HYDRALAZINE HCL 50 MG
5 TABLET ORAL ONCE
Refills: 0 | Status: COMPLETED | OUTPATIENT
Start: 2019-10-24 | End: 2019-10-24

## 2019-10-24 RX ORDER — NIFEDIPINE 30 MG
90 TABLET, EXTENDED RELEASE 24 HR ORAL DAILY
Refills: 0 | Status: DISCONTINUED | OUTPATIENT
Start: 2019-10-24 | End: 2019-10-25

## 2019-10-24 RX ORDER — SENNA PLUS 8.6 MG/1
1 TABLET ORAL DAILY
Refills: 0 | Status: DISCONTINUED | OUTPATIENT
Start: 2019-10-24 | End: 2019-10-26

## 2019-10-24 RX ORDER — ATORVASTATIN CALCIUM 80 MG/1
20 TABLET, FILM COATED ORAL AT BEDTIME
Refills: 0 | Status: DISCONTINUED | OUTPATIENT
Start: 2019-10-24 | End: 2019-10-26

## 2019-10-24 RX ORDER — ASPIRIN/CALCIUM CARB/MAGNESIUM 324 MG
81 TABLET ORAL DAILY
Refills: 0 | Status: DISCONTINUED | OUTPATIENT
Start: 2019-10-24 | End: 2019-10-26

## 2019-10-24 RX ORDER — LABETALOL HCL 100 MG
200 TABLET ORAL
Refills: 0 | Status: DISCONTINUED | OUTPATIENT
Start: 2019-10-24 | End: 2019-10-26

## 2019-10-24 RX ORDER — HYDRALAZINE HCL 50 MG
50 TABLET ORAL THREE TIMES A DAY
Refills: 0 | Status: DISCONTINUED | OUTPATIENT
Start: 2019-10-24 | End: 2019-10-26

## 2019-10-24 RX ADMIN — Medication 5 MILLIGRAM(S): at 17:58

## 2019-10-24 RX ADMIN — Medication 200 MILLIGRAM(S): at 23:45

## 2019-10-24 RX ADMIN — Medication 50 MILLIGRAM(S): at 22:29

## 2019-10-24 RX ADMIN — Medication 325 MILLIGRAM(S): at 20:40

## 2019-10-24 RX ADMIN — HEPARIN SODIUM 5000 UNIT(S): 5000 INJECTION INTRAVENOUS; SUBCUTANEOUS at 22:30

## 2019-10-24 RX ADMIN — BRIMONIDINE TARTRATE 1 DROP(S): 2 SOLUTION/ DROPS OPHTHALMIC at 22:41

## 2019-10-24 RX ADMIN — ATORVASTATIN CALCIUM 20 MILLIGRAM(S): 80 TABLET, FILM COATED ORAL at 22:29

## 2019-10-24 RX ADMIN — DORZOLAMIDE HYDROCHLORIDE 1 DROP(S): 20 SOLUTION/ DROPS OPHTHALMIC at 22:41

## 2019-10-24 RX ADMIN — LATANOPROST 1 DROP(S): 0.05 SOLUTION/ DROPS OPHTHALMIC; TOPICAL at 23:45

## 2019-10-24 NOTE — ED PROVIDER NOTE - CLINICAL SUMMARY MEDICAL DECISION MAKING FREE TEXT BOX
Male presents to ED for evaluation of left sided facial droop and slurred speech - Will check labs, EKG, imaging, medicate, adm

## 2019-10-24 NOTE — ED ADULT TRIAGE NOTE - CHIEF COMPLAINT QUOTE
patient BIBA , as per EMS patient was found on the floor by his son at 5pm , patient presented with R side facial droop and slurred speech , LKW was 3 pm

## 2019-10-24 NOTE — ED PROVIDER NOTE - OBJECTIVE STATEMENT
83 yo male presents to ED for evaluation of left facial droop and slurred speech    Patient not a candidate for tpa because time of onset unknown and low NIHSS  Stroke Neuro - Dr. Stiles 85 yo male with PMH HTN, HLD, and CHF presents to ED for evaluation of left facial droop and slurred speech, code stroke. Patient was last seen well at 3PM today, found on the floor at his son at 5PM. Patient reports he did not realize he has any symptons. Denies chest pain, shortness of breath, abd pain, nausea, vomiting.

## 2019-10-24 NOTE — ED ADULT NURSE NOTE - INTERVENTIONS DEFINITIONS
[place] to call system/Stretcher in lowest position, wheels locked, appropriate side rails in place

## 2019-10-24 NOTE — ED ADULT NURSE NOTE - PMH
No pertinent past medical history <<----- Click to add NO pertinent Past Medical History Heart failure    HTN (hypertension)    Hyperlipidemia

## 2019-10-24 NOTE — ED ADULT NURSE NOTE - NEURO GAIT
1030 Pt called she states she took all antibiotic and is feeling better but now has a yeast infection. Requesting Diflucan. She is in [facility].  Requesting [facility] Forward to Dr Snyder for review.  
unsteady

## 2019-10-24 NOTE — ED PROVIDER NOTE - PROGRESS NOTE DETAILS
Patient not a candidate for tpa because time of onset unknown and low NIHSS  Stroke Neuro - Dr. Stiles

## 2019-10-24 NOTE — CONSULT NOTE ADULT - ASSESSMENT
Subjective Complaints:      Consult requested by ER doctor:                  Attending:     History of Present Illness:  Chief Complaint/Reason for Admission:  History of Present Illness:  HPI:  Patient is an 84 M with a PMH of HTN, HLD, and CHF who presents to ED for code stroke.  Patient was reportedly last known well at 3pm today.  Found on the floor by his son at 5 pm.  Patient reportedly had slurred speech and and L facial droop.  Patient is AAOx3 and currently has no complaints.  Patient has no recollection of the event and states he feels fine.    CT head showed chronic microvascular disease along with lacunal infarcts.  CT angio showed narrowing of the ICA.  Case was discussed with neurosurgery at [facility] and was declined as patient had low stroke scale. (24 Oct 2019 21:44)        PAST MEDICAL & SURGICAL HISTORY:  Hyperlipidemia  Heart failure  HTN (hypertension)  No significant past surgical history  84yMale    MEDICATIONS  (STANDING):  aspirin  chewable 81 milliGRAM(s) Oral daily  atorvastatin 20 milliGRAM(s) Oral at bedtime  brimonidine 0.2% Ophthalmic Solution 1 Drop(s) Both EYES two times a day  dorzolamide 2% Ophthalmic Solution 1 Drop(s) Both EYES three times a day  heparin  Injectable 5000 Unit(s) SubCutaneous every 8 hours  hydrALAZINE 50 milliGRAM(s) Oral three times a day  labetalol 200 milliGRAM(s) Oral two times a day  latanoprost 0.005% Ophthalmic Solution 1 Drop(s) Both EYES at bedtime  NIFEdipine XL 90 milliGRAM(s) Oral daily  senna 1 Tablet(s) Oral daily    MEDICATIONS  (PRN):      Allergies    Allergy Status Unknown    Intolerances      FAMILY HISTORY:      REVIEW OF SYSTEMS:  General:  No wt loss, fevers, chills, night sweats  Eyes:  Good vision, no reported pain  ENT:  No sore throat, pain, runny nose, dysphagia  CV:  No pain, palpitatioins, hypo/hypertension  Resp:  No dyspnea, cough, tachypnea, wheezing  GI:  No pain, nausea, vomiting, diarrhea, constipatiion  :  No pain, bleeding, incontinence, nocturia  Muscle:  No pain, weakness  Breast:  No pain, abscess, mass, discharge  Neuro:  No weakness, tingling, memory problems  Psych:  No fatigue, insomnia, mood problems, depression  Endocrine:  No polyuria, polydypsia, cold/heat intolerance  Heme:  No petechiae, ecchymosis, easy bruisability  Skin:  No rash, tattoos, scars, edema      Vital Signs Last 24 Hrs  T(C): 36.8 (24 Oct 2019 21:34), Max: 37 (24 Oct 2019 19:19)  T(F): 98.2 (24 Oct 2019 21:34), Max: 98.6 (24 Oct 2019 19:19)  HR: 60 (24 Oct 2019 21:34) (58 - 80)  BP: 169/77 (24 Oct 2019 21:34) (131/67 - 229/106)  BP(mean): --  RR: 25 (24 Oct 2019 21:34) (16 - 25)  SpO2: 97% (24 Oct 2019 21:34) (93% - 97%)    GENERAL PHYSICAL EXAM:  General:  Appears stated age, well-groomed, well-nourished, no distress  HEENT:  NC/AT, patent nares w/ pink mucosa, OP clear w/o lesions, PERRL, EOMI, conjunctivae clear, no thyromegaly, nodules, adenopathy, no JVD  Chest:  Full & symmetric excursion, no increased effort, breath sounds clear  Cardiovascular:  Regular rhythm, S1, S2, no murmur/rub/S3/S4, no carotid/femoral/abdominal bruit, radial/pedal pulses 2+, no edema  Abdomen:  Soft, non-tender, non-distended, normoactive bowel sounds, no HSM  Extremities:  Gait & station:   Digits:   Nails:   Joints, Bones, Muscles:   ROM:   Stability:  Skin:  No rash/erythema/ecchymoses/petechiae/wounds/abscess/warm/dry  Musculoskeletal:  Full ROM in all joints w/o swelling/tenderness/effusion    NEUROLOGICAL EXAM:  HENT:  Normocephalic head; atraumatic head.  Neck supple.  ENT: normal looking.  Mental State:    Alert.  Fully oriented to person, place and date.  Coherent.  Speech clear and intact.  Cooperative.  Responds appropriately.    Cranial Nerves:  II-XII:   Pupils round and reactive to light and accommodation.  Extraocular movements full.  Visual fields full (no homonymous hemianopsia).  Visual acuity wnl.  Facial symmetry intact.  Tongue midline.  Motor Functions:  Moves all extremities.  No pronator drift of UE.  Claps hand well.  Hand  intact bilaterally.  Ambulatory.    Sensory Functions:   Intact to touch and pinprick to face and extremities.    Reflexes:  Deep tendon reflexes normoactive to biceps, knees and ankles.  Babinski absent (present).  Cerebellar Testing:    Finger to nose intact.  Nystagmus absent.  Neurovascular: Carotid auscultation full without bruits.      LABS:                        11.7   8.65  )-----------( 294      ( 24 Oct 2019 17:26 )             36.9     10-    135  |  99  |  16  ----------------------------<  118<H>  4.0   |  24  |  2.47<H>    Ca    8.7      24 Oct 2019 17:26    TPro  7.7  /  Alb  4.0  /  TBili  1.3<H>  /  DBili  x   /  AST  45<H>  /  ALT  11<L>  /  AlkPhos  92  10-    PT/INR - ( 24 Oct 2019 17:26 )   PT: 11.6 sec;   INR: 1.03 ratio         PTT - ( 24 Oct 2019 17:26 )  PTT:27.7 sec    Urinalysis Basic - ( 24 Oct 2019 18:09 )    Color: Yellow / Appearance: Clear / S.015 / pH: x  Gluc: x / Ketone: Negative  / Bili: Negative / Urobili: Negative mg/dL   Blood: x / Protein: 15 mg/dL / Nitrite: Negative   Leuk Esterase: Negative / RBC: 0-2 /HPF / WBC 0-2   Sq Epi: x / Non Sq Epi: x / Bacteria: Few        RADIOLOGY & ADDITIONAL STUDIES:      Assessment & Opinion: nevents noted  awawekmaler speech fluent follws commnads arm leg 4/5 fcae equal   tia  symptoms resolved   ct  head  no acute path  cta brain noted no interc-vention by  stroke intervention  will follow     Recommendations:  Brain MRI.  Carotid doppler.  Echocardiogram.  EEG.   DVT prophylaxis as ordered.  Medications:

## 2019-10-24 NOTE — ED PROVIDER NOTE - PHYSICAL EXAMINATION
Gen: no acute distress, well appearing, awake, alert and oriented x 3  Cardiac: regular rate and rhythm, +S1S2  Pulm: Clear to auscultation bilaterally  Abd: soft, nontender, nondistended, no guarding  Back: neg CVA ttp, nontender spine  Extremity: no edema, no deformity, warm and well perfused, FROM all extremities    Neuro: awake, alert, oriented x 3, sensorimotor intact, + mild left sided facial droop noted, no slurred speech noted

## 2019-10-24 NOTE — H&P ADULT - HISTORY OF PRESENT ILLNESS
Patient is an 84 M with a PMH of HTN, HLD, and CHF who presents to ED for code stroke.  Patient was reportedly last known well at 3pm today.  Found on the floor by his son at 5 pm.  Patient reportedly had slurred speech and and L facial droop.  Patient is AAOx3 and currently has no complaints.  Patient has no recollection of the event and states he feels fine.    CT head showed chronic microvascular disease along with lacunal infarcts.  CT angio showed narrowing of the ICA.  Case was discussed with neurosurgery at [facility] and was declined as patient had low stroke scale.

## 2019-10-24 NOTE — ED ADULT NURSE REASSESSMENT NOTE - NS ED NURSE REASSESS COMMENT FT1
pt's bed is wet from urine. pt was cleaned and offered urinal prior to going to floor. awaiting transport to floor. pt's bed is wet from urine. pt was cleaned and sheet/gown/elba changed. pt offered urinal prior to going to floor. awaiting transport to floor.

## 2019-10-24 NOTE — H&P ADULT - ASSESSMENT
84M with a PMH of CVA, HTN, CHF found at home with suspicion for stroke.  CT shows mutiple chronic infarcts.  CTA shows narrowing of L and R ICA.  Labs show elevated Cr.  Patient presented with severe HTN in ED, improved with IV hydralazine.  Passed bedside swallow exam.  Seen by Dr. Kimble.  Will admit patient for CVA workup.

## 2019-10-24 NOTE — H&P ADULT - PROBLEM/PLAN-4
Initial Services Plan        Before your first treatment group, please do the following    Immediate health & safety concerns: Look for sober housing and a supportive social network.  Look for a support network (such as AA, NA, DBT group, a [employer] group, etc.)    Suggestions for client during the time between intake & completion of treatment plan:  Review your patient or client handbook.  Identify concerns about whom to ask for family week    Client issues to be addressed in the first treatment sessions:  Identify motivations(s) for coming to treatment, i.e. legal, family, job, self  Identify concerns about contacting boss, PO  Other Pt gave counselor a sheet to fill out, need to see if counselor can fill it out or if needs to be a medical provider. Client reported he needs it ASAP, will know by group on 12/04      ZARI Long  11/29/2017  8:39 PM                     DISPLAY PLAN FREE TEXT

## 2019-10-24 NOTE — ED ADULT NURSE NOTE - ED STAT RN HANDOFF DETAILS 2
Report received from HILL Jimenez at 7pm. Assessment available on [facility]. will continue to monitor. pt appears lethargic, but is A&Ox4. on cardiac monitor at bedside. BP elevated and rechecked. Dr to be make aware. family at bedside. urine in urinal at bedside Report received from HILL Jimenez at 7pm. Assessment available on [facility]. will continue to monitor. pt appears lethargic, but is A&Ox4. on cardiac monitor at bedside. BP elevated and rechecked. Dr Miles made aware. family at bedside. urine in urinal at bedside Report received from HILL Jimenez at 7pm. Assessment available on [facility]. will continue to monitor. pt appears lethargic, but is A&Ox4. on cardiac monitor at bedside. BP elevated and rechecked. Dr Miles made aware. family at bedside. urine in urinal at bedside. no weakness noted on either side. no facial droop noted when pt smiles Report received from HILL Jimenez at 7pm. Assessment available on [facility]. will continue to monitor. pt appears lethargic, but is A&Ox4. on cardiac monitor at bedside. BP elevated and rechecked. Dr Miles made aware. family at bedside. urine in urinal at bedside. no weakness noted on either side. no facial droop noted when pt smiles. seizure precautions & fall risk precautions in place

## 2019-10-24 NOTE — H&P ADULT - NSHPLABSRESULTS_GEN_ALL_CORE
Recent Vitals  T(C): 36.8 (10-24-19 @ 21:34), Max: 37 (10-24-19 @ 19:19)  HR: 60 (10-24-19 @ 21:34) (58 - 80)  BP: 169/77 (10-24-19 @ 21:34) (131/67 - 229/106)  RR: 25 (10-24-19 @ 21:34) (16 - 25)  SpO2: 97% (10-24-19 @ 21:34) (93% - 97%)                        11.7   8.65  )-----------( 294      ( 24 Oct 2019 17:26 )             36.9     10-24    135  |  99  |  16  ----------------------------<  118<H>  4.0   |  24  |  2.47<H>    Ca    8.7      24 Oct 2019 17:26    TPro  7.7  /  Alb  4.0  /  TBili  1.3<H>  /  DBili  x   /  AST  45<H>  /  ALT  11<L>  /  AlkPhos  92  10-    PT/INR - ( 24 Oct 2019 17:26 )   PT: 11.6 sec;   INR: 1.03 ratio         PTT - ( 24 Oct 2019 17:26 )  PTT:27.7 sec  LIVER FUNCTIONS - ( 24 Oct 2019 17:26 )  Alb: 4.0 g/dL / Pro: 7.7 gm/dL / ALK PHOS: 92 U/L / ALT: 11 U/L / AST: 45 U/L / GGT: x           Urinalysis Basic - ( 24 Oct 2019 18:09 )    Color: Yellow / Appearance: Clear / S.015 / pH: x  Gluc: x / Ketone: Negative  / Bili: Negative / Urobili: Negative mg/dL   Blood: x / Protein: 15 mg/dL / Nitrite: Negative   Leuk Esterase: Negative / RBC: 0-2 /HPF / WBC 0-2   Sq Epi: x / Non Sq Epi: x / Bacteria: Few        Home Medications:  atorvastatin 20 mg oral tablet: 1 tab(s) orally once a day (24 Oct 2019 18:33)  brimonidine 0.2% ophthalmic solution: to each affected eye 2 times a day (24 Oct 2019 18:33)  dorzolamide 2% ophthalmic solution: 1  to each affected eye 2 times a day (24 Oct 2019 18:33)  hydrALAZINE: 150 milligram(s) orally 3 times a day (24 Oct 2019 18:33)  labetalol 200 mg oral tablet: 1 tab(s) orally 2 times a day (24 Oct 2019 18:33)  NIFEdipine 90 mg oral tablet, extended release: 1 tab(s) orally once a day (24 Oct 2019 18:33)  Xalatan 0.005% ophthalmic solution: 1 drop(s) to each affected eye once a day (in the evening) (24 Oct 2019 18:33)

## 2019-10-24 NOTE — H&P ADULT - NSHPPHYSICALEXAM_GEN_ALL_CORE
Physical exam:  General: patient in no acute distress, resting comfortably  Eyes: bilateral cataracts  Cardio: S1/S2 +ve, regular rate and rhythm, mild systolic murmur   Resp: clear to ausculation bilaterally, no rales or wheezes  GI: abdomen soft, nontender, non distended, no guarding, BS +ve x 4  Ext: no significant pedal edema  Neuro: CN 2-12 intact, no motor or sensory deficits in all 4 extremities,  Minimal L sided facial droop, mild dysarthria.  Finger to nose with no abnormalities.

## 2019-10-25 LAB
CHOLEST SERPL-MCNC: 146 MG/DL — SIGNIFICANT CHANGE UP (ref 10–199)
HBA1C BLD-MCNC: 4.7 % — SIGNIFICANT CHANGE UP (ref 4–5.6)
HDLC SERPL-MCNC: 34 MG/DL — LOW
LIPID PNL WITH DIRECT LDL SERPL: 76 MG/DL — SIGNIFICANT CHANGE UP
TOTAL CHOLESTEROL/HDL RATIO MEASUREMENT: 4.3 RATIO — SIGNIFICANT CHANGE UP (ref 3.4–9.6)
TRIGL SERPL-MCNC: 181 MG/DL — HIGH (ref 10–149)

## 2019-10-25 PROCEDURE — 99232 SBSQ HOSP IP/OBS MODERATE 35: CPT

## 2019-10-25 PROCEDURE — 93306 TTE W/DOPPLER COMPLETE: CPT | Mod: 26

## 2019-10-25 PROCEDURE — 93880 EXTRACRANIAL BILAT STUDY: CPT | Mod: 26

## 2019-10-25 RX ORDER — INFLUENZA VIRUS VACCINE 15; 15; 15; 15 UG/.5ML; UG/.5ML; UG/.5ML; UG/.5ML
0.5 SUSPENSION INTRAMUSCULAR ONCE
Refills: 0 | Status: COMPLETED | OUTPATIENT
Start: 2019-10-25 | End: 2019-10-25

## 2019-10-25 RX ORDER — NIFEDIPINE 30 MG
90 TABLET, EXTENDED RELEASE 24 HR ORAL DAILY
Refills: 0 | Status: DISCONTINUED | OUTPATIENT
Start: 2019-10-25 | End: 2019-10-26

## 2019-10-25 RX ADMIN — Medication 50 MILLIGRAM(S): at 14:43

## 2019-10-25 RX ADMIN — HEPARIN SODIUM 5000 UNIT(S): 5000 INJECTION INTRAVENOUS; SUBCUTANEOUS at 21:59

## 2019-10-25 RX ADMIN — HEPARIN SODIUM 5000 UNIT(S): 5000 INJECTION INTRAVENOUS; SUBCUTANEOUS at 14:46

## 2019-10-25 RX ADMIN — LATANOPROST 1 DROP(S): 0.05 SOLUTION/ DROPS OPHTHALMIC; TOPICAL at 21:58

## 2019-10-25 RX ADMIN — HEPARIN SODIUM 5000 UNIT(S): 5000 INJECTION INTRAVENOUS; SUBCUTANEOUS at 05:41

## 2019-10-25 RX ADMIN — Medication 50 MILLIGRAM(S): at 05:41

## 2019-10-25 RX ADMIN — SENNA PLUS 1 TABLET(S): 8.6 TABLET ORAL at 21:59

## 2019-10-25 RX ADMIN — BRIMONIDINE TARTRATE 1 DROP(S): 2 SOLUTION/ DROPS OPHTHALMIC at 05:41

## 2019-10-25 RX ADMIN — Medication 200 MILLIGRAM(S): at 05:41

## 2019-10-25 RX ADMIN — DORZOLAMIDE HYDROCHLORIDE 1 DROP(S): 20 SOLUTION/ DROPS OPHTHALMIC at 14:43

## 2019-10-25 RX ADMIN — ATORVASTATIN CALCIUM 20 MILLIGRAM(S): 80 TABLET, FILM COATED ORAL at 21:59

## 2019-10-25 RX ADMIN — DORZOLAMIDE HYDROCHLORIDE 1 DROP(S): 20 SOLUTION/ DROPS OPHTHALMIC at 05:41

## 2019-10-25 RX ADMIN — DORZOLAMIDE HYDROCHLORIDE 1 DROP(S): 20 SOLUTION/ DROPS OPHTHALMIC at 21:58

## 2019-10-25 RX ADMIN — Medication 81 MILLIGRAM(S): at 11:27

## 2019-10-25 RX ADMIN — Medication 90 MILLIGRAM(S): at 05:41

## 2019-10-25 RX ADMIN — BRIMONIDINE TARTRATE 1 DROP(S): 2 SOLUTION/ DROPS OPHTHALMIC at 17:19

## 2019-10-25 NOTE — SWALLOW BEDSIDE ASSESSMENT ADULT - SWALLOW EVAL: PATIENT/FAMILY GOALS STATEMENT
pt hopes the tests come back -pt asked "today?" and he can leave today. pt stated "I don't know why i'm here", "I feel fine".

## 2019-10-25 NOTE — PROGRESS NOTE ADULT - PROBLEM SELECTOR PLAN 1
Received Aspirin 325 mg PO.  Continue 81 mg, statins.   Monitor BP Closely, to maintain -100/ DBP 60-90 (MAP ).  Neurology on board- Dr Kimble- recommend CTA brain and neck shows moderate vertebral artery stenosis, neuro to comment.      PT Evaluation- no PT needs. Speech & swallow evaluation- appreciated- without dentures recommend Dysphagic diet, patient can eat regular diet with dentures, will change to regular diet.   Plan discussed at length with patient and his daughter.

## 2019-10-25 NOTE — PHYSICAL THERAPY INITIAL EVALUATION ADULT - GENERAL OBSERVATIONS, REHAB EVAL
Pt was seen in supine c cardiac monitor and O2 at 1l/min   through nasal cannula, alert and Ox4. Pt was comfortable and motivated.

## 2019-10-25 NOTE — SWALLOW BEDSIDE ASSESSMENT ADULT - SWALLOW EVAL: DIAGNOSIS
pt presented with oropharyngeal phases of swallow grossly WNL except increased mastication time- may be 2/2 incomplete dentition. no overt signs of aspiration

## 2019-10-25 NOTE — PHYSICAL THERAPY INITIAL EVALUATION ADULT - ADDITIONAL COMMENTS
There are 5 steps c rafia rails,  close and able to be reached simultaneously, at the entry of the house and one flight of stairs c rail to negotiate at home.

## 2019-10-25 NOTE — PATIENT PROFILE ADULT - DO YOU FEEL UNSAFE AT WORK?
Patient seen and examined  Some improvement in wheezing with continued dose of Prednisone 60 mgs qd  Some facial puffiness from steroids  No leg edema  Discussed with house staff: can be discharged today and will see in office next week  Continue use of Alprazolam: chronic anxiety worsened with use of steroids and inhalers.
not applicable

## 2019-10-25 NOTE — SWALLOW BEDSIDE ASSESSMENT ADULT - COMMENTS
CT head no contrast 10/24/2019IMPRESSION: CT HEAD: Moderately severe chronic microvascular ischemic disease. Chronic lacunar infarcts in anterior limb of the left internal capsule and right lentiform nucleus

## 2019-10-25 NOTE — SWALLOW BEDSIDE ASSESSMENT ADULT - SLP GENERAL OBSERVATIONS
pt seen bedside, alert and oriented x3 "I don't know what happened". pt responded to autobiographical/orientation questions with good accuracy, verbalized needs and was able to follow one step directions. noted good speech intelligibility with raspy vocal quality. pt stated "I was really tired, I got good sleep".

## 2019-10-25 NOTE — SWALLOW BEDSIDE ASSESSMENT ADULT - H & P REVIEW
Patient is an 84 M with a PMH of HTN, HLD, and CHF who presents to ED for code stroke.  Patient was reportedly last known well at 3pm today.  Found on the floor by his son at 5 pm.  Patient reportedly had slurred speech and and L facial droop.  Patient is AAOx3 and currently has no complaints.  Patient has no recollection of the event and states he feels fine./yes

## 2019-10-25 NOTE — PHYSICAL THERAPY INITIAL EVALUATION ADULT - PERTINENT HX OF CURRENT PROBLEM, REHAB EVAL
Pt was found on the floor by his son at 5 pm on Oct. 24th.  Patient reportedly had slurred speech and L facial droop

## 2019-10-25 NOTE — PROGRESS NOTE ADULT - SUBJECTIVE AND OBJECTIVE BOX
Patient is a 84y old  Male who presents with a chief complaint of code stroke (25 Oct 2019 18:42)      SUBJECTIVE / OVERNIGHT EVENTS: No events over night.     T(C): 36.4 (10-25-19 @ 16:41), Max: 36.4 (10-25-19 @ 16:41)  HR: 77 (10-25-19 @ 16:41) (61 - 77)  BP: 108/80 (10-25-19 @ 16:41) (108/80 - 154/71)  RR: 19 (10-25-19 @ 16:41) (18 - 19)  SpO2: 99% (10-25-19 @ 16:41) (95% - 99%)      MEDICATIONS  (STANDING):  aspirin  chewable 81 milliGRAM(s) Oral daily  atorvastatin 20 milliGRAM(s) Oral at bedtime  brimonidine 0.2% Ophthalmic Solution 1 Drop(s) Both EYES two times a day  dorzolamide 2% Ophthalmic Solution 1 Drop(s) Both EYES three times a day  heparin  Injectable 5000 Unit(s) SubCutaneous every 8 hours  hydrALAZINE 50 milliGRAM(s) Oral three times a day  influenza   Vaccine 0.5 milliLiter(s) IntraMuscular once  labetalol 200 milliGRAM(s) Oral two times a day  latanoprost 0.005% Ophthalmic Solution 1 Drop(s) Both EYES at bedtime  NIFEdipine XL 90 milliGRAM(s) Oral daily  senna 1 Tablet(s) Oral daily    MEDICATIONS  (PRN):    PHYSICAL EXAM:  GENERAL: NAD, well-developed  HEAD:  Atraumatic, Normocephalic  EYES: EOMI, conjunctiva and sclera clear  NECK: Supple, No JVD  CHEST/LUNG: Clear to auscultation bilaterally; No wheeze  HEART: Regular rate and rhythm; No murmurs, rubs, or gallops  ABDOMEN: Soft, Nontender, Nondistended; Bowel sounds present  EXTREMITIES: 4/5 power Rt and Lt lower extremity, 2+ Peripheral Pulses, No clubbing, cyanosis, or edema  speech coherant   PSYCH: AAOx3  NEUROLOGY: non-focal  SKIN: No rashes or lesions                          11.7   8.65  )-----------( 294      ( 24 Oct 2019 17:26 )             36.9       CARDIAC MARKERS ( 24 Oct 2019 17:26 )  .024 ng/mL / x     / x     / x     / x          LIVER FUNCTIONS - ( 24 Oct 2019 17:26 )  Alb: 4.0 g/dL / Pro: 7.7 gm/dL / ALK PHOS: 92 U/L / ALT: 11 U/L / AST: 45 U/L / GGT: x           PT/INR - ( 24 Oct 2019 17:26 )   PT: 11.6 sec;   INR: 1.03 ratio         PTT - ( 24 Oct 2019 17:26 )  PTT:27.7 sec    CAPILLARY BLOOD GLUCOSE    CAPILLARY BLOOD GLUCOSE              RADIOLOGY & ADDITIONAL TESTS:    Imaging Personally Reviewed:    Consultant(s) Notes Reviewed:      Care Discussed with Consultants/Other Providers:

## 2019-10-26 ENCOUNTER — TRANSCRIPTION ENCOUNTER (OUTPATIENT)
Age: 84
End: 2019-10-26

## 2019-10-26 VITALS
TEMPERATURE: 97 F | OXYGEN SATURATION: 97 % | HEART RATE: 62 BPM | RESPIRATION RATE: 17 BRPM | DIASTOLIC BLOOD PRESSURE: 64 MMHG | SYSTOLIC BLOOD PRESSURE: 149 MMHG

## 2019-10-26 DIAGNOSIS — I10 ESSENTIAL (PRIMARY) HYPERTENSION: ICD-10-CM

## 2019-10-26 PROCEDURE — 99239 HOSP IP/OBS DSCHRG MGMT >30: CPT

## 2019-10-26 RX ORDER — LABETALOL HCL 100 MG
1 TABLET ORAL
Qty: 0 | Refills: 0 | DISCHARGE

## 2019-10-26 RX ORDER — NIFEDIPINE 30 MG
1 TABLET, EXTENDED RELEASE 24 HR ORAL
Qty: 0 | Refills: 0 | DISCHARGE
Start: 2019-10-26

## 2019-10-26 RX ORDER — ATORVASTATIN CALCIUM 80 MG/1
1 TABLET, FILM COATED ORAL
Qty: 0 | Refills: 0 | DISCHARGE
Start: 2019-10-26

## 2019-10-26 RX ORDER — LATANOPROST 0.05 MG/ML
1 SOLUTION/ DROPS OPHTHALMIC; TOPICAL
Qty: 0 | Refills: 0 | DISCHARGE
Start: 2019-10-26

## 2019-10-26 RX ORDER — HYDRALAZINE HCL 50 MG
150 TABLET ORAL
Qty: 0 | Refills: 0 | DISCHARGE

## 2019-10-26 RX ORDER — HYDRALAZINE HCL 50 MG
1 TABLET ORAL
Qty: 0 | Refills: 0 | DISCHARGE
Start: 2019-10-26

## 2019-10-26 RX ORDER — LABETALOL HCL 100 MG
1 TABLET ORAL
Qty: 0 | Refills: 0 | DISCHARGE
Start: 2019-10-26

## 2019-10-26 RX ORDER — LATANOPROST 0.05 MG/ML
1 SOLUTION/ DROPS OPHTHALMIC; TOPICAL
Qty: 0 | Refills: 0 | DISCHARGE

## 2019-10-26 RX ORDER — NIFEDIPINE 30 MG
1 TABLET, EXTENDED RELEASE 24 HR ORAL
Qty: 0 | Refills: 0 | DISCHARGE

## 2019-10-26 RX ADMIN — Medication 200 MILLIGRAM(S): at 17:42

## 2019-10-26 RX ADMIN — Medication 81 MILLIGRAM(S): at 11:27

## 2019-10-26 RX ADMIN — Medication 50 MILLIGRAM(S): at 05:35

## 2019-10-26 RX ADMIN — HEPARIN SODIUM 5000 UNIT(S): 5000 INJECTION INTRAVENOUS; SUBCUTANEOUS at 05:36

## 2019-10-26 RX ADMIN — DORZOLAMIDE HYDROCHLORIDE 1 DROP(S): 20 SOLUTION/ DROPS OPHTHALMIC at 05:35

## 2019-10-26 RX ADMIN — BRIMONIDINE TARTRATE 1 DROP(S): 2 SOLUTION/ DROPS OPHTHALMIC at 17:42

## 2019-10-26 RX ADMIN — Medication 90 MILLIGRAM(S): at 05:35

## 2019-10-26 RX ADMIN — DORZOLAMIDE HYDROCHLORIDE 1 DROP(S): 20 SOLUTION/ DROPS OPHTHALMIC at 13:54

## 2019-10-26 RX ADMIN — Medication 50 MILLIGRAM(S): at 13:54

## 2019-10-26 RX ADMIN — BRIMONIDINE TARTRATE 1 DROP(S): 2 SOLUTION/ DROPS OPHTHALMIC at 05:35

## 2019-10-26 RX ADMIN — HEPARIN SODIUM 5000 UNIT(S): 5000 INJECTION INTRAVENOUS; SUBCUTANEOUS at 13:54

## 2019-10-26 RX ADMIN — Medication 200 MILLIGRAM(S): at 05:35

## 2019-10-26 NOTE — DISCHARGE NOTE NURSING/CASE MANAGEMENT/SOCIAL WORK - NSDCPEPTSTRK_GEN_ALL_CORE
Stroke warning signs and symptoms/Need for follow up after discharge/Stroke education booklet/Prescribed medications/Risk factors for stroke/Signs and symptoms of stroke/Call 911 for stroke/Stroke support groups for patients, families, and friends

## 2019-10-26 NOTE — PROGRESS NOTE ADULT - ASSESSMENT
Subjective Complaints:  Historian:             Vital Signs Last 24 Hrs  T(C): 36.4 (25 Oct 2019 16:41), Max: 37 (24 Oct 2019 19:19)  T(F): 97.5 (25 Oct 2019 16:41), Max: 98.6 (24 Oct 2019 19:19)  HR: 77 (25 Oct 2019 16:41) (58 - 80)  BP: 108/80 (25 Oct 2019 16:41) (108/80 - 198/96)  BP(mean): --  RR: 19 (25 Oct 2019 16:41) (16 - 25)  SpO2: 99% (25 Oct 2019 16:41) (94% - 99%)    GENERAL PHYSICAL EXAM:  General:  Appears stated age, well-groomed, well-nourished, no distress  HEENT:  NC/AT, patent nares w/ pink mucosa, OP clear w/o lesions, PERRL, EOMI, conjunctivae clear, no thyromegaly, nodules, adenopathy, no JVD  Chest:  Full & symmetric excursion, no increased effort, breath sounds clear  Cardiovascular:  Regular rhythm, S1, S2, no murmur/rub/S3/S4, no carotid/femoral/abdominal bruit, radial/pedal pulses 2+, no edema  Abdomen:  Soft, non-tender, non-distended, normoactive bowel sounds, no HSM  Extremities:  Gait & station:   Digits:   Nails:   Joints, Bones, Muscles:   ROM:   Stability:  Skin:  No rash/erythema/ecchymoses/petechiae/wounds/abscess/warm/dry  Musculoskeletal:  Full ROM in all joints w/o swelling/tenderness/effusion        LABS:                        11.7   8.65  )-----------( 294      ( 24 Oct 2019 17:26 )             36.9     10-24    135  |  99  |  16  ----------------------------<  118<H>  4.0   |  24  |  2.47<H>    Ca    8.7      24 Oct 2019 17:26    TPro  7.7  /  Alb  4.0  /  TBili  1.3<H>  /  DBili  x   /  AST  45<H>  /  ALT  11<L>  /  AlkPhos  92  10-    PT/INR - ( 24 Oct 2019 17:26 )   PT: 11.6 sec;   INR: 1.03 ratio         PTT - ( 24 Oct 2019 17:26 )  PTT:27.7 sec  Urinalysis Basic - ( 24 Oct 2019 18:09 )    Color: Yellow / Appearance: Clear / S.015 / pH: x  Gluc: x / Ketone: Negative  / Bili: Negative / Urobili: Negative mg/dL   Blood: x / Protein: 15 mg/dL / Nitrite: Negative   Leuk Esterase: Negative / RBC: 0-2 /HPF / WBC 0-2   Sq Epi: x / Non Sq Epi: x / Bacteria: Few        RADIOLOGY & ADDITIONAL STUDIES:        Neurology Progress Note:      Mental Status: awake  alert speech fluent  follws commamnads       Cranial Nerves: 2 ./1in ytact      Motor:   arm legf 4/5         Sensory:intact      Cerebellar:deefrd        Gait: deferd  sitting in chair       Assesment/Plan:   tia speech fluent arm leg 4/5 ct head no acute cta be=rain and neck no acute path  on asa
84M with a PMH of CVA, HTN, CHF found at home with suspicion for stroke.  CT shows mutiple chronic infarcts.  CTA shows narrowing of L and R ICA.  Labs show elevated Cr.  Patient presented with severe HTN in ED, improved with IV hydralazine.  Passed bedside swallow exam.  Seen by Dr. Kimble.  Will admit patient for CVA workup.
84M with a PMH of CVA, HTN, CHF found at home with suspicion for stroke.  CT shows mutiple chronic infarcts.  CTA shows narrowing of L and R ICA.  Labs show elevated Cr.  Patient presented with severe HTN in ED, improved with IV hydralazine.  Passed bedside swallow exam.  Seen by Dr. Kimble.  Will admit patient for CVA workup.

## 2019-10-26 NOTE — DISCHARGE NOTE NURSING/CASE MANAGEMENT/SOCIAL WORK - PATIENT PORTAL LINK FT
You can access the FollowMyHealth Patient Portal offered by [facility] by registering at the following website: [email or web address]. By joining [facility]’s FollowMyHealth portal, you will also be able to view your health information using other applications (apps) compatible with our system.

## 2019-10-26 NOTE — DISCHARGE NOTE PROVIDER - HOSPITAL COURSE
Patient is an 84 M with a PMH of HTN, HLD, and CHF who presents to ED for code stroke.  Patient was reportedly last known well at 3pm today.  Found on the floor by his son at 5 pm.  Patient reportedly had slurred speech and and L facial droop.  Patient is AAOx3 and currently has no complaints.  Patient has no recollection of the event and states he feels fine.      CT head showed chronic microvascular disease along with lacunal infarcts.  CT angio showed narrowing of the ICA.  Case was discussed with neurosurgery at [facility] and was declined as patient had low stroke scale.   Patient presented with severe HTN in ED, improved with IV hydralazine.  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   Speech & swallow evaluation done and recommend dysphagic diet without dentures and regular diet with dentures.

## 2019-10-26 NOTE — PROGRESS NOTE ADULT - PROBLEM SELECTOR PLAN 2
Continue labetalol 200 BID, nifedipine 90mg, hydralazine 50 mg TID
Continue labetalol 200 BID, nifedipine 90mg, hydralazine 50 mg TID

## 2019-10-26 NOTE — PROGRESS NOTE ADULT - SUBJECTIVE AND OBJECTIVE BOX
INTERVAL HPI/OVERNIGHT EVENTS:  Pt seen and examined at bedside.     Allergies/Intolerance: Allergy Status Unknown  No Known Allergies      MEDICATIONS  (STANDING):  aspirin  chewable 81 milliGRAM(s) Oral daily  atorvastatin 20 milliGRAM(s) Oral at bedtime  brimonidine 0.2% Ophthalmic Solution 1 Drop(s) Both EYES two times a day  dorzolamide 2% Ophthalmic Solution 1 Drop(s) Both EYES three times a day  heparin  Injectable 5000 Unit(s) SubCutaneous every 8 hours  hydrALAZINE 50 milliGRAM(s) Oral three times a day  influenza   Vaccine 0.5 milliLiter(s) IntraMuscular once  labetalol 200 milliGRAM(s) Oral two times a day  latanoprost 0.005% Ophthalmic Solution 1 Drop(s) Both EYES at bedtime  NIFEdipine XL 90 milliGRAM(s) Oral daily  senna 1 Tablet(s) Oral daily    MEDICATIONS  (PRN):        ROS: all systems reviewed and wnl      PHYSICAL EXAMINATION:  Vital Signs Last 24 Hrs  T(C): 36.4 (26 Oct 2019 04:58), Max: 36.8 (25 Oct 2019 23:40)  T(F): 97.6 (26 Oct 2019 04:58), Max: 98.2 (25 Oct 2019 23:40)  HR: 59 (26 Oct 2019 08:18) (59 - 77)  BP: 140/65 (26 Oct 2019 04:58) (108/80 - 140/65)  BP(mean): --  RR: 19 (26 Oct 2019 04:58) (18 - 19)  SpO2: 97% (26 Oct 2019 04:58) (95% - 99%)  CAPILLARY BLOOD GLUCOSE          10-25 @ :01  -  10-26 @ 07:00  --------------------------------------------------------  IN: 550 mL / OUT: 450 mL / NET: 100 mL    10-26 @ 07:01  -  10-26 @ 11:17  --------------------------------------------------------  IN: 250 mL / OUT: 550 mL / NET: -300 mL        GENERAL:   NECK: supple, No JVD  CHEST/LUNG: clear to auscultation bilaterally; no rales, rhonchi, or wheezing b/l  HEART: normal S1, S2  ABDOMEN: BS+, soft, ND, NT   EXTREMITIES:  pulses palpable; no clubbing, cyanosis, or edema b/l LEs  SKIN: no rashes or lesions      LABS:                        11.7   8.65  )-----------( 294      ( 24 Oct 2019 17:26 )             36.9     10    135  |  99  |  16  ----------------------------<  118<H>  4.0   |  24  |  2.47<H>    Ca    8.7      24 Oct 2019 17:26    TPro  7.7  /  Alb  4.0  /  TBili  1.3<H>  /  DBili  x   /  AST  45<H>  /  ALT  11<L>  /  AlkPhos  92  10-24    PT/INR - ( 24 Oct 2019 17:26 )   PT: 11.6 sec;   INR: 1.03 ratio         PTT - ( 24 Oct 2019 17:26 )  PTT:27.7 sec  Urinalysis Basic - ( 24 Oct 2019 18:09 )    Color: Yellow / Appearance: Clear / S.015 / pH: x  Gluc: x / Ketone: Negative  / Bili: Negative / Urobili: Negative mg/dL   Blood: x / Protein: 15 mg/dL / Nitrite: Negative   Leuk Esterase: Negative / RBC: 0-2 /HPF / WBC 0-2   Sq Epi: x / Non Sq Epi: x / Bacteria: Few INTERVAL HPI/OVERNIGHT EVENTS:  Pt seen and examined at bedside.     Allergies/Intolerance: Allergy Status Unknown  No Known Allergies      MEDICATIONS  (STANDING):  aspirin  chewable 81 milliGRAM(s) Oral daily  atorvastatin 20 milliGRAM(s) Oral at bedtime  brimonidine 0.2% Ophthalmic Solution 1 Drop(s) Both EYES two times a day  dorzolamide 2% Ophthalmic Solution 1 Drop(s) Both EYES three times a day  heparin  Injectable 5000 Unit(s) SubCutaneous every 8 hours  hydrALAZINE 50 milliGRAM(s) Oral three times a day  influenza   Vaccine 0.5 milliLiter(s) IntraMuscular once  labetalol 200 milliGRAM(s) Oral two times a day  latanoprost 0.005% Ophthalmic Solution 1 Drop(s) Both EYES at bedtime  NIFEdipine XL 90 milliGRAM(s) Oral daily  senna 1 Tablet(s) Oral daily    MEDICATIONS  (PRN):        ROS: all systems reviewed and wnl      PHYSICAL EXAMINATION:  Vital Signs Last 24 Hrs  T(C): 36.4 (26 Oct 2019 04:58), Max: 36.8 (25 Oct 2019 23:40)  T(F): 97.6 (26 Oct 2019 04:58), Max: 98.2 (25 Oct 2019 23:40)  HR: 59 (26 Oct 2019 08:18) (59 - 77)  BP: 140/65 (26 Oct 2019 04:58) (108/80 - 140/65)  BP(mean): --  RR: 19 (26 Oct 2019 04:58) (18 - 19)  SpO2: 97% (26 Oct 2019 04:58) (95% - 99%)  CAPILLARY BLOOD GLUCOSE          10-25 @ :01  -  10-26 @ 07:00  --------------------------------------------------------  IN: 550 mL / OUT: 450 mL / NET: 100 mL    10-26 @ 07:01  -  10-26 @ 11:17  --------------------------------------------------------  IN: 250 mL / OUT: 550 mL / NET: -300 mL        GENERAL: stable, no focal motor deficits, speech fluent.   NECK: supple, No JVD  CHEST/LUNG: clear to auscultation bilaterally; no rales, rhonchi, or wheezing b/l  HEART: normal S1, S2  ABDOMEN: BS+, soft, ND, NT   EXTREMITIES:  pulses palpable; no clubbing, cyanosis, or edema b/l LEs  SKIN: no rashes or lesions      LABS:                        11.7   8.65  )-----------( 294      ( 24 Oct 2019 17:26 )             36.9     10-    135  |  99  |  16  ----------------------------<  118<H>  4.0   |  24  |  2.47<H>    Ca    8.7      24 Oct 2019 17:26    TPro  7.7  /  Alb  4.0  /  TBili  1.3<H>  /  DBili  x   /  AST  45<H>  /  ALT  11<L>  /  AlkPhos  92  10    PT/INR - ( 24 Oct 2019 17:26 )   PT: 11.6 sec;   INR: 1.03 ratio         PTT - ( 24 Oct 2019 17:26 )  PTT:27.7 sec  Urinalysis Basic - ( 24 Oct 2019 18:09 )    Color: Yellow / Appearance: Clear / S.015 / pH: x  Gluc: x / Ketone: Negative  / Bili: Negative / Urobili: Negative mg/dL   Blood: x / Protein: 15 mg/dL / Nitrite: Negative   Leuk Esterase: Negative / RBC: 0-2 /HPF / WBC 0-2   Sq Epi: x / Non Sq Epi: x / Bacteria: Few

## 2019-10-26 NOTE — DISCHARGE NOTE PROVIDER - NSDCCPCAREPLAN_GEN_ALL_CORE_FT
PRINCIPAL DISCHARGE DIAGNOSIS  Diagnosis: Cerebrovascular accident (CVA), unspecified mechanism  Assessment and Plan of Treatment: -CT head showed chronic microvascular disease along with lacunal infarcts.    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  Speech & swallow evaluation done and recommend dysphagic diet without dentures and regular diet with dentures.          SECONDARY DISCHARGE DIAGNOSES  Diagnosis: Essential hypertension  Assessment and Plan of Treatment: -cont hydralazine, nifedipine, labetolol, imdur

## 2019-10-26 NOTE — PROGRESS NOTE ADULT - PROBLEM SELECTOR PLAN 1
Received Aspirin 325 mg PO.  Continue 81 mg, Lipitor 20 mg/day. Monitor BP Closely, to maintain -100/ DBP 60-90 (MAP ). Neurology on board- Dr Kimble. CTA brain and neck shows moderate vertebral artery stenosis, neuro to comment.  PT Evaluation- no PT needs. Speech & swallow evaluation- appreciated- without dentures recommend Dysphagic diet, patient can eat regular diet with dentures, will change to regular diet.  Plan discussed at length with patient and his daughter. Received Aspirin 325 mg PO.  Continue 81 mg, Lipitor 20 mg/day. Monitor BP Closely, to maintain -100/ DBP 60-90 (MAP ). Neurology on board- Dr Kimble. CTA brain and neck shows moderate vertebral artery stenosis, neuro to comment.  PT Evaluation- no PT needs. Speech & swallow evaluation- appreciated- without dentures recommend Dysphagic diet, patient can eat regular diet with dentures, will change to regular diet.  Plan discussed at length with patient and his daughter. Discharge to home today.

## 2019-10-29 DIAGNOSIS — I63.233 CEREBRAL INFARCTION DUE TO UNSPECIFIED OCCLUSION OR STENOSIS OF BILATERAL CAROTID ARTERIES: ICD-10-CM

## 2019-10-29 DIAGNOSIS — I11.0 HYPERTENSIVE HEART DISEASE WITH HEART FAILURE: ICD-10-CM

## 2019-10-29 DIAGNOSIS — H40.9 UNSPECIFIED GLAUCOMA: ICD-10-CM

## 2019-10-29 DIAGNOSIS — I50.9 HEART FAILURE, UNSPECIFIED: ICD-10-CM

## 2019-10-29 DIAGNOSIS — E78.5 HYPERLIPIDEMIA, UNSPECIFIED: ICD-10-CM

## 2019-10-29 DIAGNOSIS — I63.9 CEREBRAL INFARCTION, UNSPECIFIED: ICD-10-CM

## 2020-01-01 NOTE — SWALLOW BEDSIDE ASSESSMENT ADULT - SWALLOW EVAL: RECOMMENDED DIET
dysphagia 2 mechanical soft with thin liquids-  upgrade at MD discretion when lower dentures available
82

## 2020-03-11 PROBLEM — E78.5 HYPERLIPIDEMIA, UNSPECIFIED: Chronic | Status: ACTIVE | Noted: 2019-10-24

## 2020-03-11 PROBLEM — I10 ESSENTIAL (PRIMARY) HYPERTENSION: Chronic | Status: ACTIVE | Noted: 2019-10-24

## 2020-03-11 PROBLEM — I50.9 HEART FAILURE, UNSPECIFIED: Chronic | Status: ACTIVE | Noted: 2019-10-24

## 2020-08-05 ENCOUNTER — APPOINTMENT (OUTPATIENT)
Dept: CARDIOLOGY | Facility: HOSPITAL | Age: 85
End: 2020-08-05

## 2020-11-06 ENCOUNTER — APPOINTMENT (OUTPATIENT)
Dept: CARDIOLOGY | Facility: CLINIC | Age: 85
End: 2020-11-06
Payer: MEDICARE

## 2020-11-06 ENCOUNTER — NON-APPOINTMENT (OUTPATIENT)
Age: 85
End: 2020-11-06

## 2020-11-06 VITALS
WEIGHT: 200 LBS | TEMPERATURE: 96.4 F | SYSTOLIC BLOOD PRESSURE: 157 MMHG | HEIGHT: 69 IN | BODY MASS INDEX: 29.62 KG/M2 | DIASTOLIC BLOOD PRESSURE: 76 MMHG | HEART RATE: 60 BPM | RESPIRATION RATE: 16 BRPM | OXYGEN SATURATION: 97 %

## 2020-11-06 DIAGNOSIS — Z86.39 PERSONAL HISTORY OF OTHER ENDOCRINE, NUTRITIONAL AND METABOLIC DISEASE: ICD-10-CM

## 2020-11-06 DIAGNOSIS — Z87.448 PERSONAL HISTORY OF OTHER DISEASES OF URINARY SYSTEM: ICD-10-CM

## 2020-11-06 DIAGNOSIS — Z83.79 FAMILY HISTORY OF OTHER DISEASES OF THE DIGESTIVE SYSTEM: ICD-10-CM

## 2020-11-06 DIAGNOSIS — I50.30 UNSPECIFIED DIASTOLIC (CONGESTIVE) HEART FAILURE: ICD-10-CM

## 2020-11-06 DIAGNOSIS — E78.5 HYPERLIPIDEMIA, UNSPECIFIED: ICD-10-CM

## 2020-11-06 DIAGNOSIS — N18.9 CHRONIC KIDNEY DISEASE, UNSPECIFIED: ICD-10-CM

## 2020-11-06 DIAGNOSIS — Z81.1 FAMILY HISTORY OF ALCOHOL ABUSE AND DEPENDENCE: ICD-10-CM

## 2020-11-06 DIAGNOSIS — Z78.9 OTHER SPECIFIED HEALTH STATUS: ICD-10-CM

## 2020-11-06 DIAGNOSIS — Z86.73 PERSONAL HISTORY OF TRANSIENT ISCHEMIC ATTACK (TIA), AND CEREBRAL INFARCTION W/OUT RESIDUAL DEFICITS: ICD-10-CM

## 2020-11-06 DIAGNOSIS — I50.32 CHRONIC DIASTOLIC (CONGESTIVE) HEART FAILURE: ICD-10-CM

## 2020-11-06 DIAGNOSIS — Z82.49 FAMILY HISTORY OF ISCHEMIC HEART DISEASE AND OTHER DISEASES OF THE CIRCULATORY SYSTEM: ICD-10-CM

## 2020-11-06 DIAGNOSIS — H40.9 UNSPECIFIED GLAUCOMA: ICD-10-CM

## 2020-11-06 DIAGNOSIS — Z86.79 PERSONAL HISTORY OF OTHER DISEASES OF THE CIRCULATORY SYSTEM: ICD-10-CM

## 2020-11-06 DIAGNOSIS — Z86.69 PERSONAL HISTORY OF OTHER DISEASES OF THE NERVOUS SYSTEM AND SENSE ORGANS: ICD-10-CM

## 2020-11-06 DIAGNOSIS — I10 ESSENTIAL (PRIMARY) HYPERTENSION: ICD-10-CM

## 2020-11-06 PROCEDURE — 99072 ADDL SUPL MATRL&STAF TM PHE: CPT

## 2020-11-06 PROCEDURE — 99214 OFFICE O/P EST MOD 30 MIN: CPT

## 2020-11-06 PROCEDURE — 93000 ELECTROCARDIOGRAM COMPLETE: CPT

## 2020-11-08 PROBLEM — Z86.79 HISTORY OF HYPERTENSION: Status: RESOLVED | Noted: 2020-11-08 | Resolved: 2020-11-08

## 2020-11-08 PROBLEM — I50.30 DIASTOLIC HEART FAILURE: Status: RESOLVED | Noted: 2020-11-08 | Resolved: 2020-11-08

## 2020-11-08 PROBLEM — Z83.79 FAMILY HISTORY OF HEPATIC CIRRHOSIS: Status: ACTIVE | Noted: 2020-11-08

## 2020-11-08 PROBLEM — H40.9 GLAUCOMA: Status: ACTIVE | Noted: 2020-11-08

## 2020-11-08 PROBLEM — I50.32 CHRONIC DIASTOLIC CONGESTIVE HEART FAILURE: Status: ACTIVE | Noted: 2017-03-22

## 2020-11-08 PROBLEM — Z86.39 HISTORY OF HYPERLIPIDEMIA: Status: RESOLVED | Noted: 2020-11-08 | Resolved: 2020-11-08

## 2020-11-08 PROBLEM — Z82.49 FAMILY HISTORY OF HYPERTENSION: Status: ACTIVE | Noted: 2020-11-08

## 2020-11-08 PROBLEM — Z78.9 SOCIAL ALCOHOL USE: Status: ACTIVE | Noted: 2020-11-08

## 2020-11-08 PROBLEM — Z86.73 HISTORY OF STROKE: Status: RESOLVED | Noted: 2020-11-08 | Resolved: 2020-11-08

## 2020-11-08 PROBLEM — E78.5 HYPERLIPIDEMIA: Status: ACTIVE | Noted: 2020-11-08

## 2020-11-08 PROBLEM — Z86.69 HISTORY OF GLAUCOMA: Status: RESOLVED | Noted: 2020-11-08 | Resolved: 2020-11-08

## 2020-11-08 PROBLEM — N18.9 CHRONIC KIDNEY DISEASE (CKD): Status: ACTIVE | Noted: 2020-11-08

## 2020-11-08 PROBLEM — Z87.448 HISTORY OF CHRONIC KIDNEY DISEASE: Status: RESOLVED | Noted: 2020-11-08 | Resolved: 2020-11-08

## 2020-11-08 PROBLEM — Z81.1 FAMILY HISTORY OF ALCOHOLISM: Status: ACTIVE | Noted: 2020-11-08

## 2020-11-08 RX ORDER — ISOSORBIDE MONONITRATE 30 MG/1
30 TABLET, EXTENDED RELEASE ORAL
Qty: 30 | Refills: 4 | Status: ACTIVE | COMMUNITY

## 2020-11-08 RX ORDER — ATORVASTATIN CALCIUM 20 MG/1
20 TABLET, FILM COATED ORAL
Qty: 30 | Refills: 4 | Status: ACTIVE | COMMUNITY

## 2020-11-08 RX ORDER — HYDRALAZINE HYDROCHLORIDE 50 MG/1
50 TABLET ORAL 3 TIMES DAILY
Qty: 90 | Refills: 4 | Status: ACTIVE | COMMUNITY

## 2020-11-08 RX ORDER — LABETALOL HYDROCHLORIDE 200 MG/1
200 TABLET, FILM COATED ORAL
Qty: 180 | Refills: 3 | Status: ACTIVE | COMMUNITY

## 2020-11-08 NOTE — HISTORY OF PRESENT ILLNESS
[FreeTextEntry1] : Mr. Jha presents to the office today for a follow up visit.\par \par Mr. Jha is an 85 year old gentleman with a medical history significant for hypertension, hyperlipidemia, heart failure with preserved ejection fraction (HFpEF)/diastolic dysfunction, chronic kidney disease (CKD), glaucoma, and a previous cerebrovascular accident.\par \par Mr. Jha reports that he has been feeling generally well since his last office visit.  He is able to walk approximately three blocks at a time before having to stop to catch his breath.  He also experiences some dyspnea with climbing stairs.  He has had no dyspnea at rest.  In addition, there has been no history of chest pain either at rest or with exertion.  Mr. Jha denies having any palpitations, presyncope, or syncope.  In addition, there has been no history of orthopnea, praoxysmal nocturnal dyspnea, or edema.

## 2020-11-08 NOTE — DISCUSSION/SUMMARY
[FreeTextEntry1] : In summary, Mr. Jha is an 85 year old gentleman with a medical history significant for hypertension, hyperlipidemia, heart failure with preserved ejection fraction (HFpEF)/diastolic dysfunction, chronic kidney disease (CKD), glaucoma, and a previous cerebrovascular accident.\par \par Mr. Jha appears to be doing reasonably well from a cardiac standpoint, although he does experience exertional dyspnea.  In addition, his blood pressure was mildly elevated at today's office visit.  He reports that he will be following up in Primary Care shortly, at which time his blood pressure will be remeasured.  Mr. Jha will follow up with me in approximately six months, at which time we will obtain a follow up transthoracic echocardiogram.\par \par Again, I would like to thank you for the privilege of participating in the care of your patient.  I will be certain to be in touch with you again following Mr. Jha's next office visit.

## 2021-05-07 ENCOUNTER — APPOINTMENT (OUTPATIENT)
Dept: CARDIOLOGY | Facility: CLINIC | Age: 86
End: 2021-05-07

## 2021-05-07 ENCOUNTER — APPOINTMENT (OUTPATIENT)
Dept: CV DIAGNOSITCS | Facility: HOSPITAL | Age: 86
End: 2021-05-07

## 2021-10-03 ENCOUNTER — INPATIENT (INPATIENT)
Facility: HOSPITAL | Age: 86
LOS: 3 days | Discharge: HOME | End: 2021-10-07
Attending: FAMILY MEDICINE | Admitting: FAMILY MEDICINE
Payer: COMMERCIAL

## 2021-10-03 VITALS
OXYGEN SATURATION: 94 % | SYSTOLIC BLOOD PRESSURE: 123 MMHG | WEIGHT: 184.97 LBS | TEMPERATURE: 98 F | RESPIRATION RATE: 24 BRPM | HEIGHT: 72 IN | DIASTOLIC BLOOD PRESSURE: 79 MMHG | HEART RATE: 102 BPM

## 2021-10-03 LAB
ALBUMIN SERPL ELPH-MCNC: 3.7 G/DL — SIGNIFICANT CHANGE UP (ref 3.3–5)
ALP SERPL-CCNC: 97 U/L — SIGNIFICANT CHANGE UP (ref 40–120)
ALT FLD-CCNC: 33 U/L — SIGNIFICANT CHANGE UP (ref 12–78)
ANION GAP SERPL CALC-SCNC: 7 MMOL/L — SIGNIFICANT CHANGE UP (ref 5–17)
APPEARANCE UR: CLEAR — SIGNIFICANT CHANGE UP
AST SERPL-CCNC: 59 U/L — HIGH (ref 15–37)
BACTERIA # UR AUTO: ABNORMAL
BASOPHILS # BLD AUTO: 0.09 K/UL — SIGNIFICANT CHANGE UP (ref 0–0.2)
BASOPHILS NFR BLD AUTO: 1.1 % — SIGNIFICANT CHANGE UP (ref 0–2)
BILIRUB SERPL-MCNC: 1.4 MG/DL — HIGH (ref 0.2–1.2)
BILIRUB UR-MCNC: NEGATIVE — SIGNIFICANT CHANGE UP
BUN SERPL-MCNC: 25 MG/DL — HIGH (ref 7–23)
CALCIUM SERPL-MCNC: 8.8 MG/DL — SIGNIFICANT CHANGE UP (ref 8.5–10.1)
CHLORIDE SERPL-SCNC: 108 MMOL/L — SIGNIFICANT CHANGE UP (ref 96–108)
CO2 SERPL-SCNC: 23 MMOL/L — SIGNIFICANT CHANGE UP (ref 22–31)
COLOR SPEC: YELLOW — SIGNIFICANT CHANGE UP
CREAT SERPL-MCNC: 2.22 MG/DL — HIGH (ref 0.5–1.3)
DIFF PNL FLD: NEGATIVE — SIGNIFICANT CHANGE UP
EOSINOPHIL # BLD AUTO: 0.38 K/UL — SIGNIFICANT CHANGE UP (ref 0–0.5)
EOSINOPHIL NFR BLD AUTO: 4.7 % — SIGNIFICANT CHANGE UP (ref 0–6)
FLUAV AG NPH QL: SIGNIFICANT CHANGE UP
FLUBV AG NPH QL: SIGNIFICANT CHANGE UP
GLUCOSE SERPL-MCNC: 151 MG/DL — HIGH (ref 70–99)
GLUCOSE UR QL: NEGATIVE MG/DL — SIGNIFICANT CHANGE UP
HCT VFR BLD CALC: 37.8 % — LOW (ref 39–50)
HGB BLD-MCNC: 12.4 G/DL — LOW (ref 13–17)
IMM GRANULOCYTES NFR BLD AUTO: 0.2 % — SIGNIFICANT CHANGE UP (ref 0–1.5)
KETONES UR-MCNC: NEGATIVE — SIGNIFICANT CHANGE UP
LACTATE SERPL-SCNC: 1.4 MMOL/L — SIGNIFICANT CHANGE UP (ref 0.7–2)
LEUKOCYTE ESTERASE UR-ACNC: NEGATIVE — SIGNIFICANT CHANGE UP
LYMPHOCYTES # BLD AUTO: 1 K/UL — SIGNIFICANT CHANGE UP (ref 1–3.3)
LYMPHOCYTES # BLD AUTO: 12.3 % — LOW (ref 13–44)
MCHC RBC-ENTMCNC: 29.7 PG — SIGNIFICANT CHANGE UP (ref 27–34)
MCHC RBC-ENTMCNC: 32.8 GM/DL — SIGNIFICANT CHANGE UP (ref 32–36)
MCV RBC AUTO: 90.4 FL — SIGNIFICANT CHANGE UP (ref 80–100)
MONOCYTES # BLD AUTO: 0.89 K/UL — SIGNIFICANT CHANGE UP (ref 0–0.9)
MONOCYTES NFR BLD AUTO: 11 % — SIGNIFICANT CHANGE UP (ref 2–14)
NEUTROPHILS # BLD AUTO: 5.74 K/UL — SIGNIFICANT CHANGE UP (ref 1.8–7.4)
NEUTROPHILS NFR BLD AUTO: 70.7 % — SIGNIFICANT CHANGE UP (ref 43–77)
NITRITE UR-MCNC: NEGATIVE — SIGNIFICANT CHANGE UP
NRBC # BLD: 0 /100 WBCS — SIGNIFICANT CHANGE UP (ref 0–0)
NT-PROBNP SERPL-SCNC: 863 PG/ML — HIGH (ref 0–450)
PH UR: 6.5 — SIGNIFICANT CHANGE UP (ref 5–8)
PLATELET # BLD AUTO: 262 K/UL — SIGNIFICANT CHANGE UP (ref 150–400)
POTASSIUM SERPL-MCNC: 3.5 MMOL/L — SIGNIFICANT CHANGE UP (ref 3.5–5.3)
POTASSIUM SERPL-SCNC: 3.5 MMOL/L — SIGNIFICANT CHANGE UP (ref 3.5–5.3)
PROT SERPL-MCNC: 7 GM/DL — SIGNIFICANT CHANGE UP (ref 6–8.3)
PROT UR-MCNC: 30 MG/DL
RBC # BLD: 4.18 M/UL — LOW (ref 4.2–5.8)
RBC # FLD: 15 % — HIGH (ref 10.3–14.5)
RBC CASTS # UR COMP ASSIST: SIGNIFICANT CHANGE UP /HPF (ref 0–4)
SARS-COV-2 RNA SPEC QL NAA+PROBE: SIGNIFICANT CHANGE UP
SODIUM SERPL-SCNC: 138 MMOL/L — SIGNIFICANT CHANGE UP (ref 135–145)
SP GR SPEC: 1.01 — SIGNIFICANT CHANGE UP (ref 1.01–1.02)
UROBILINOGEN FLD QL: 1 MG/DL
WBC # BLD: 8.12 K/UL — SIGNIFICANT CHANGE UP (ref 3.8–10.5)
WBC # FLD AUTO: 8.12 K/UL — SIGNIFICANT CHANGE UP (ref 3.8–10.5)

## 2021-10-03 PROCEDURE — 93010 ELECTROCARDIOGRAM REPORT: CPT

## 2021-10-03 PROCEDURE — 99285 EMERGENCY DEPT VISIT HI MDM: CPT

## 2021-10-03 PROCEDURE — 70450 CT HEAD/BRAIN W/O DYE: CPT | Mod: 26,MA

## 2021-10-03 PROCEDURE — 71045 X-RAY EXAM CHEST 1 VIEW: CPT | Mod: 26

## 2021-10-03 PROCEDURE — 99223 1ST HOSP IP/OBS HIGH 75: CPT

## 2021-10-03 RX ORDER — ONDANSETRON 8 MG/1
4 TABLET, FILM COATED ORAL EVERY 8 HOURS
Refills: 0 | Status: DISCONTINUED | OUTPATIENT
Start: 2021-10-03 | End: 2021-10-07

## 2021-10-03 RX ORDER — ACETAMINOPHEN 500 MG
650 TABLET ORAL EVERY 6 HOURS
Refills: 0 | Status: DISCONTINUED | OUTPATIENT
Start: 2021-10-03 | End: 2021-10-07

## 2021-10-03 RX ORDER — SODIUM CHLORIDE 9 MG/ML
1000 INJECTION INTRAMUSCULAR; INTRAVENOUS; SUBCUTANEOUS ONCE
Refills: 0 | Status: COMPLETED | OUTPATIENT
Start: 2021-10-03 | End: 2021-10-03

## 2021-10-03 RX ADMIN — SODIUM CHLORIDE 1000 MILLILITER(S): 9 INJECTION INTRAMUSCULAR; INTRAVENOUS; SUBCUTANEOUS at 19:18

## 2021-10-03 RX ADMIN — SODIUM CHLORIDE 1000 MILLILITER(S): 9 INJECTION INTRAMUSCULAR; INTRAVENOUS; SUBCUTANEOUS at 16:25

## 2021-10-03 NOTE — H&P ADULT - NSHPLABSRESULTS_GEN_ALL_CORE
T(C): 36.9 (10-04-21 @ 05:20), Max: 37 (10-03-21 @ 17:00)  HR: 90 (10-04-21 @ 05:20) (82 - 102)  BP: 155/77 (10-04-21 @ 05:20) (123/79 - 155/77)  RR: 18 (10-04-21 @ 05:20) (16 - 24)  SpO2: 100% (10-04-21 @ 05:20) (94% - 100%)                        12.4   8.12  )-----------( 262      ( 03 Oct 2021 16:31 )             37.8     10-03    138  |  108  |  25<H>  ----------------------------<  151<H>  3.5   |  23  |  2.22<H>    Ca    8.8      03 Oct 2021 17:37    TPro  7.0  /  Alb  3.7  /  TBili  1.4<H>  /  DBili  x   /  AST  59<H>  /  ALT  33  /  AlkPhos  97  1003    LIVER FUNCTIONS - ( 03 Oct 2021 17:37 )  Alb: 3.7 g/dL / Pro: 7.0 gm/dL / ALK PHOS: 97 U/L / ALT: 33 U/L / AST: 59 U/L / GGT: x             Urinalysis Basic - ( 03 Oct 2021 20:05 )    Color: Yellow / Appearance: Clear / S.010 / pH: x  Gluc: x / Ketone: Negative  / Bili: Negative / Urobili: 1 mg/dL   Blood: x / Protein: 30 mg/dL / Nitrite: Negative   Leuk Esterase: Negative / RBC: 0-2 /HPF / WBC x   Sq Epi: x / Non Sq Epi: x / Bacteria: Few    < from: CT Head No Cont (10.03.21 @ 21:13) >    IMPRESSION:  No acute intracranial hemorrhage or mass effect.      < end of copied text >      acetaminophen   Tablet .. 650 milliGRAM(s) Oral every 6 hours PRN  aspirin enteric coated 81 milliGRAM(s) Oral daily  atorvastatin 20 milliGRAM(s) Oral at bedtime  brimonidine 0.2% Ophthalmic Solution 1 Drop(s) Both EYES two times a day  dorzolamide 2% Ophthalmic Solution 1 Drop(s) Both EYES three times a day  heparin   Injectable 5000 Unit(s) SubCutaneous every 8 hours  hydrALAZINE 50 milliGRAM(s) Oral three times a day  influenza   Vaccine 0.5 milliLiter(s) IntraMuscular once  isosorbide   mononitrate ER Tablet (IMDUR) 30 milliGRAM(s) Oral daily  labetalol 200 milliGRAM(s) Oral two times a day  latanoprost 0.005% Ophthalmic Solution 1 Drop(s) Both EYES at bedtime  NIFEdipine XL 90 milliGRAM(s) Oral daily  ondansetron Injectable 4 milliGRAM(s) IV Push every 8 hours PRN  torsemide 10 milliGRAM(s) Oral daily

## 2021-10-03 NOTE — ED ADULT NURSE NOTE - NSIMPLEMENTINTERV_GEN_ALL_ED
Implemented All Fall with Harm Risk Interventions:  [place] to call system. Call bell, personal items and telephone within reach. Instruct patient to call for assistance. Room bathroom lighting operational. Non-slip footwear when patient is off stretcher. Physically safe environment: no spills, clutter or unnecessary equipment. Stretcher in lowest position, wheels locked, appropriate side rails in place. Provide visual cue, wrist band, yellow gown, etc. Monitor gait and stability. Monitor for mental status changes and reorient to person, place, and time. Review medications for side effects contributing to fall risk. Reinforce activity limits and safety measures with patient and family. Provide visual clues: red socks.

## 2021-10-03 NOTE — ED ADULT TRIAGE NOTE - CHIEF COMPLAINT QUOTE
pt a&O x2 , follows commands dementia at baseline  biba from home, noncompliant with medications and poor appetite.  PMH HTN

## 2021-10-03 NOTE — ED PROVIDER NOTE - CLINICAL SUMMARY MEDICAL DECISION MAKING FREE TEXT BOX
"  Rene Goodman is a 32 y.o. male who presents with   Chief Complaint   Patient presents with   • Hypertension     Patient seen on June 1.  Blood pressure 150/100.  Then he was taking hydrochlorothiazide 12.5 mg daily along with benazepril/amlodipine 5-10 every morning.  Blood pressure was gradually climbing since September.  On that visit metoprolol  mg added but he took it for about 4 days and had to reduce it to 1/2 tablet daily because of lightheadedness.  Since then no more symptoms.   .    32-year-old male.  Blood pressure reevaluation from his visit of June 1 as noted above.  His weight has increased from 285 pounds on June 1 to his current weight of 295 pounds.  At his height of approximately 5 feet 10 his estimated goal weight is around 185 pounds.  As noted, he was not able to tolerate metoprolol  mg but when he cut it back to 1/2 tablet, his symptoms of lightheadedness and dizziness went away.  In general he said he feels much better now with the addition of metoprolol than he did on June 1.  He was having headaches and and is not having any now he says.    Hypertension   This is a chronic problem. The current episode started more than 1 year ago. The problem has been waxing and waning since onset. The problem is controlled.        /88   Pulse 87   Temp 98.6 °F (37 °C)   Resp 13   Ht 175.3 cm (69.02\")   Wt 134 kg (295 lb 6.4 oz)   SpO2 99%   BMI 43.60 kg/m²     The following portions of the patient's history were reviewed and updated as appropriate: allergies, current medications, past medical history and problem list.    Review of Systems   Constitutional: Negative.    HENT: Negative.    Eyes: Negative.    Respiratory: Negative.    Cardiovascular: Negative.    Genitourinary: Negative.    Musculoskeletal: Negative.    Skin: Negative.    Neurological: Negative.    Psychiatric/Behavioral: Negative.        Objective   Physical Exam   Constitutional: He is oriented to person, place, " and time. He appears well-developed and well-nourished. No distress.   HENT:   Head: Normocephalic and atraumatic.   Eyes: Pupils are equal, round, and reactive to light. Conjunctivae and EOM are normal.   Neck: Normal range of motion. Neck supple. No thyromegaly present.   Neck exam negative.  Carotid auscultation normal-no bruits heard.   Cardiovascular: Normal rate, regular rhythm, normal heart sounds and intact distal pulses. Exam reveals no gallop and no friction rub.   No murmur heard.  Pulmonary/Chest: Effort normal and breath sounds normal. No respiratory distress. He has no wheezes. He has no rales. He exhibits no tenderness.   Neurological: He is alert and oriented to person, place, and time.   Psychiatric: He has a normal mood and affect. His behavior is normal. Judgment and thought content normal.   Nursing note and vitals reviewed.      Assessment/Plan   Rene was seen today for hypertension.    Diagnoses and all orders for this visit:    Essential hypertension      Plan: Blood pressure now within acceptable range at 128/88.  Continue metoprolol XL succinate 100 mg - 1/2 tablet daily along with the rest of his antihypertensive medications as noted above.    Patient advised that annual physical for sometime in the next 6 months.  We will update labs then.  Going forward we will probably see him at six-month intervals, once for a physical and once for a general checkup/lab update visit/blood pressure check.          AMS x 1wk, r/o infectious process, consider most likely UTI 87yo M W/ PMH HTN, CHF pw AMS x 1wk. AFVSS. Well appearing, in NAD. Exam as noted in PE. Plan: CBC, CMP, lactate, UA/C, CXR, COVID, trop, BNP, ECG, CT brain. Re-eval. Pt care signed out at change of shift. Anticipate admission.

## 2021-10-03 NOTE — ED ADULT NURSE NOTE - OBJECTIVE STATEMENT
pt a&Ox2   biba from home, noncompliant with medications and poor appetite.  PMH HTN. pt denies pain. poor historian

## 2021-10-03 NOTE — H&P ADULT - NSHPPHYSICALEXAM_GEN_ALL_CORE
PHYSICAL EXAM:    Vital Signs Last 24 Hrs  T(C): 36.9 (04 Oct 2021 05:20), Max: 37 (03 Oct 2021 17:00)  T(F): 98.5 (04 Oct 2021 05:20), Max: 98.6 (03 Oct 2021 17:00)  HR: 90 (04 Oct 2021 05:20) (82 - 102)  BP: 155/77 (04 Oct 2021 05:20) (123/79 - 155/77)  BP(mean): --  RR: 18 (04 Oct 2021 05:20) (16 - 24)  SpO2: 100% (04 Oct 2021 05:20) (94% - 100%)    GENERAL: Pt lying in bed comfortably in NAD  HEENT:  Atraumatic, EOMI, PERRL, conjunctiva and sclera clear, MMM, cataracts  NECK: Supple  CHEST/LUNG: Clear to auscultation bilaterally  HEART: Regular rate and rhythm  ABDOMEN: Bowel sounds present; Soft, Nontender, Nondistended.     EXTREMITIES:  2+ Peripheral Pulses, brisk capillary refill. No edema  NEUROLOGICAL:  Alert & Oriented X3, speech clear. Answers questions appropriately. Full and equal strength B/L upper and lower extremities. No deficits   MSK: FROM x 4 extremities   SKIN: No rashes or lesions

## 2021-10-03 NOTE — H&P ADULT - ASSESSMENT
87 y/o M w/ PMHx HTN, HLD, Diastolic CHF, CKD stage 4, CVA presents to the ED due abnormal behavior x 1wk. Pt presently awake, alert oriented to self place and time however not to situation; states he knows he's in the Hospital however is unsure why.     1) AMS, Agitation  - unclear etiology  - per triage, pt w/ underlying Dementia reported to EMS; unsure if ED obtain same hx   - CT head w/ parenchymal volume loss as well as scattered hypodensities in the periventricular white matter are nonspecific, but likely sequela of small vessel ischemic disease. Findings typically seen in pt's w/ Vascular dementia  - possible change in behaviour likely worsening dementia. Pt currently AAOX x 3, No focal deficits on neuro exam, UA, CXR neg, no sns or sxs of acute infection    2) HTN  - c/w home meds    3) CVA  - c/w ASA and statin    4) CKD  - renal fxn at baseline  - cont to monitor    5) DVT ppx - HSQ

## 2021-10-03 NOTE — ED ADULT TRIAGE NOTE - ARRIVAL FROM
Progress Notes by Marixa Hirsch MD at 2/1/2019 10:20 AM     Author: Marixa Hirsch MD Service: -- Author Type: Physician    Filed: 2/1/2019  9:47 PM Encounter Date: 2/1/2019 Status: Signed    : Marixa Hirsch MD (Physician)       ASSESSMENT/PLAN:  1. Acute non-recurrent maxillary sinusitis  She has acute sinusitis. Recommend treatment with nasal saline rinses 3-4 times daily as tolerated. Also recommend amoxicillin as prescribed. Recommend tylenol or ibuprofen as needed for comfort. Return if worsening or not improving in the next 3-4 days.     2. Fall, initial encounter  She had a fall yesterday. She has some muscular pain of the back and neck. Recommend warm packs and ibuprofen as needed for the next 2-3 days. Return if she is worsening or not improving.     3. Common wart  Francisca has a wart that is improving. Discussed that this is caused by a virus and will resolve on its own. If they would like this to happen more quickly, they can try salicylic acid daily or could treat in the clinic with liquid nitrogen. They will try the salicylic acid at home first. And return if it is not improving.         Patient Instructions   1. Soak the hand   2. Then use a pumice stone to scrape off the callous.  3. Then apply Salicylic acid (wart remover treatment) daily 17-40%  4. Return to clinic in 3-4 weeks if not improving or worsening.    Patient Education     Acute Sinusitis    Acute sinusitis is irritation and swelling of the sinuses. It is usually caused by a viral infection after a common cold. Your doctor can help you find relief.  What is acute sinusitis?  Sinuses are air-filled spaces in the skull behind the face. They are kept moist and clean by a lining of mucosa. Things such as pollen, smoke, and chemical fumes can irritate the mucosa. It can then swell up. As a response to irritation, the mucosa makes more mucus and other fluids. Tiny hairlike cilia cover the mucosa.  Cilia help carry mucus toward the opening of the sinus. Too much mucus may cause the cilia to stop working. This blocks the sinus opening. A buildup of fluid in the sinuses then causes pain and pressure. It can also encourage bacteria to grow in the sinuses.  Common symptoms of acute sinusitis  You may have:    Facial soreness pain    Headache    Fever    Fluid draining in the back of the throat (postnasal drip)    Congestion    Drainage that is thick and colored, instead of clear    Cough  Diagnosing acute sinusitis  Your doctor will ask about your symptoms and health history. He or she will look at your ear, nose, and throat. You usually won't need to have X-rays taken.    The doctor may take a sample of mucus to check for bacteria. If you have sinusitis that keeps coming back, you may need imaging tests such as X-rays or CAT scans. This will help your doctor check for a structural problem that may be causing the infection.  Treating acute sinusitis  Treatment is aimed at unblocking the sinus opening and helping the cilia work again. You may need to take antihistamine and decongestant medicine. These can reduce inflammation and decrease the amount of fluid your sinuses make. If you have a bacterial infection, you will need to take antibiotic medicine for 10 to 14 days. Take this medicine until it is gone, even if you feel better.  Date Last Reviewed: 10/1/2016    [id number] [employer]. [address] [place] [address], [place] [address]. All rights reserved. This information is not intended as a substitute for professional medical care. Always follow your healthcare professional's instructions.               No orders of the defined types were placed in this encounter.    There are no discontinued medications.    No Follow-up on file.    CHIEF COMPLAINT:  Chief Complaint   Patient presents with   ? Cough     had for several weeks, persistent, not getting better or worse       HISTORY OF PRESENT  ILLNESS:  Francisca is a 6 y.o. female presenting to the clinic today due to a cough for the past 2-3 weeks. Mother is not sure if she is clearing her throat and this is a tic or if she is sick. She has had nasal congestion throughout this time. She did have a fever at the beginning of the illness, but that has resolved. She is not having difficulty breathing. She is otherwise doing well.     She also had a fall yesterday. She fell partway down the stairs yesterday. She didn't hit her head. She cried right away. She was acting well afterward. She is complaining of some pain in her back and neck today. No bruising was noted.     She also has a wart on her left had. It seems to be improving overall. She has not tolerated the OTC treatments of the bandaids at home.     REVIEW OF SYSTEMS:   Review of Symptoms: History obtained from mother and the patient.    All other systems are negative.    PFSH:      History reviewed. No pertinent past medical history.    Family History   Problem Relation Age of Onset   ? Asthma Mother    ? No Medical Problems Father    ? No Medical Problems Maternal Grandmother    ? No Medical Problems Maternal Grandfather    ? No Medical Problems Paternal Grandmother    ? No Medical Problems Paternal Grandfather        No past surgical history on file.    TOBACCO USE:  Social History     Tobacco Use   Smoking Status Never Smoker   Smokeless Tobacco Never Used       VITALS:  Vitals:    02/01/19 1028   Pulse: 115   Temp: 98.8  F (37.1  C)   TempSrc: Oral   SpO2: 96%   Weight: 46 lb (20.9 kg)     Wt Readings from Last 3 Encounters:   02/01/19 46 lb (20.9 kg) (51 %, Z= 0.02)*   12/17/18 46 lb 3.2 oz (21 kg) (56 %, Z= 0.15)*   11/28/18 46 lb (20.9 kg) (56 %, Z= 0.16)*     * Growth percentiles are based on CDC (Girls, 2-20 Years) data.     There is no height or weight on file to calculate BMI.    PHYSICAL EXAM:  Constitutional: She appears well-developed and well-nourished.   HEENT: Head: Normocephalic.     Right Ear: Tympanic membrane, external ear and canal normal.    Left Ear: Tympanic membrane, external ear and canal normal.    Nose: Erythematous edematous nasal turbinates with mucous present.    Mouth/Throat: Mucous membranes are moist. Oropharynx is clear.    Eyes: Conjunctivae and lids are normal. Pupils are equal, round, and reactive to light.   Neck: Neck supple. Tenderness of the muscles of the neck bilaterally.   Cardiovascular: Regular rate and regular rhythm. No murmur heard.  Pulses: Femoral pulses are 2+ bilaterally.   Pulmonary/Chest: Effort normal and breath sounds normal. There is normal air entry.   Musculoskeletal: Normal range of motion. Normal strength and tone. Tenderness of the paraspinous muscles. No tenderness of the spinous processes.  Skin: Small 3 mm verrucous papule on the left hand.  Neurological: She is alert. She has normal reflexes. No cranial nerve deficit. Gait normal. Wearing AFOs.  Psychiatric: She has a normal mood and affect. Her speech is normal and behavior is normal.     Electronically signed by Marixa Hirsch MD 2/1/2019 9:36 PM          Home

## 2021-10-03 NOTE — ED ADULT NURSE NOTE - NSICDXFAMHXNEG_GEN_ALL
Subjective:   Tobias Still  1947  [phone]  [phone]    11/21/2017    [facility] CARDIOLOG[facility]    Kayode Aldrich MD  [facility] [place] [address] [place] [id number]        Patient ID: Tobias Still is a 69 y.o. male.    Chief Complaint:   Chief Complaint   Patient presents with   • Atrial Fibrillation   • Shortness of Breath       PROBLEM LIST:  1. Complete heart block/bradycardia:  a. Implantation of a dual-chamber permanent pacemaker (Medtronic), 01/19/2009.  2. Atrial fibrillation/MAT/atrial flutter:  a. Onset in 1997, treated with Tambocor, which was changed to amiodarone, Rythmol, then back to Tambocor therapy.  b. History of Corgard, atenolol, Sectral, verapamil, and Calan use, intolerant secondary to fatigue.  c. Stress echo/echo 1 year ago at [facility], no data.  d. Echocardiogram, 03/23/2010, normal LV size and function.  e. Echocardiogram, October 2014, left ventricular ejection fraction 55% to 60%, mild mitral regurgitation, mild tricuspid regurgitation.   f. Chadsvasc=1  3. Shortness of breath:  a. GXT, 06/26/2015: normal LV size and function, no ischemia.  b. Found to have significant anemia with an hematocrit of 27.  c. EGD with subsequent cauterization of an ulcer, database incomplete.  4. Chronic back pain.  5. Surgical history:  a. Left ankle.  b. Left wrist.  c. Right shoulder.  d. Right wrist.     Allergies  No Known Allergies    Current Outpatient Prescriptions:   •  aspirin 81 MG EC tablet, Take 81 mg by mouth daily., Disp: , Rfl:   •  digoxin (LANOXIN) 250 MCG tablet, Take 250 mcg by mouth every day., Disp: , Rfl:   •  ferrous sulfate 325 (65 FE) MG tablet, Take 325 mg by mouth 3 (Three) Times a Day With Meals., Disp: , Rfl:   •  flecainide (TAMBOCOR) 100 MG tablet, Take 100 mg by mouth 2 (two) times a day., Disp: , Rfl:   •  HYDROcodone-acetaminophen (NORCO)  MG per tablet, Take 1 tablet by mouth every 6 (six) hours as  "needed for moderate pain (4-6)., Disp: , Rfl:   •  omeprazole (PriLOSEC) 20 MG capsule, Take 20 mg by mouth daily., Disp: , Rfl:   •  tamsulosin (FLOMAX) 0.4 MG capsule 24 hr capsule, Take 1 capsule by mouth every night., Disp: , Rfl:     History of Present Illness  The patient is a pleasant 69-year-old gentleman presents today for follow-up regarding history of atrial fibrillation, palpitations, bradycardia.  He reports no change in his overall health since last visit with our office.  He denies any sustained palpitations.  He denies any chest pain.  He denies any dizziness near-syncope or syncope events.  He is not having lab work for some time and he does have a known history of chronic anemia but is taking his iron therapy.        The following portions of the patient's history were reviewed and updated as appropriate: allergies, current medications, past family history, past medical history, past social history, past surgical history and problem list.    ROS   14 point ROS negative except as outlined in problem list, HPI and other parts of the note.    Procedures       Objective:       Vitals:    11/21/17 1033   BP: 128/60   BP Location: Right arm   Patient Position: Sitting   Pulse: 65   Weight: 172 lb 12.8 oz (78.4 kg)   Height: 67\" (170.2 cm)       GENERAL: Well-developed, well-nourished patient in no acute distress.  HEENT: Normocephalic, atraumatic,   NECK: No JVD present at 30°. No carotid bruits auscultated.  LUNGS: Clear to auscultation.  CARDIOVASCULAR: Heart has a regular rate and rhythm. No murmurs, gallops or rubs noted.   SKIN: Pink, warm  Neuro: Nonfocal exam. Gait intact  Ext: No edema or bruising    The patient's old records including ambulatory rhythm recordings (ECGs, Holter/event monitor) were reviewed and discussed.      Lab Review:   Results for orders placed or performed during the hospital encounter of 07/15/15   Hemoglobin A1c   Result Value Ref Range    Hemoglobin A1C 6.3 (H) 4.00 - " 6.00 %    Mean Bld Glu Estim. 129 mg/dL   CBC (No diff)   Result Value Ref Range    WBC 7.19 3.50 - 10.80 K/mcL    RBC 3.99 (L) 4.20 - 5.76 M/mcL    Hemoglobin 8.0 (L) 13.1 - 17.5 g/dL    Hematocrit 28.3 (L) 38.9 - 50.9 %    MCV 70.9 (L) 80.0 - 99.0 fL    MCH 20.1 (L) 27.0 - 31.0 pg    MCHC 28.3 (L) 32.0 - 36.0 g/dL    RDW-CV 15.8 (H) 11.3 - 14.5 %    Platelets 342 150 - 450 K/mcL   Basic metabolic panel   Result Value Ref Range    Glucose 102 (H) 70 - 100 mg/dL    BUN 12 9 - 23 mg/dL    Creatinine 0.8 0.6 - 1.3 mg/dL    Sodium 142 132 - 146 mmol/L    Potassium 4.6 3.5 - 5.5 mmol/L    Chloride 106 99 - 109 mmol/L    CO2 27 20 - 31 mmol/L    Calcium 9.1 8.7 - 10.4 mg/dL    eGFR 96 ml/min/1.732    Anion Gap 9 3 - 11 mmol/L   Lipid panel   Result Value Ref Range    Total Cholesterol 150 0 - 200 mg/dL    Triglycerides 129 0 - 150 mg/dL    HDL Cholesterol 34 (L) 40 - 60 mg/dL    LDL Cholesterol  105 0 - 130 mg/dL             Device Interrogation: Dual-chamber Medtronic device a paced.  RV paced 3%.  R-wave 5.6 mV.  Threshold 0.7 V at 0 perform oh seconds.  Impedance 507 ohms.  Device KRISTIN on 09/22/2017.    Diagnosis:   1. PAF:  Tambocor therapy, No recurrent Afib. Chadsvasc=1. ASA daily.   2. Bradycardia: Medtronic Pacemaker KRISTIN 9/17.  3. Chronic Anemia  3. Negative MPS 2015    Assessment & Plan:   We discussed pursuing pacemaker generator change at this time.  The risk and benefits explaineddetail to the patient he would like to proceed.  He will continue current medical therapy and further records will made following device generator change.    DAGOBERTO Gary  11/21/17  11:03 AM   hypertension

## 2021-10-03 NOTE — ED ADULT NURSE NOTE - NSICDXPASTMEDICALHX_GEN_ALL_CORE_FT
PAST MEDICAL HISTORY:  CHF (congestive heart failure)     Heart failure     HTN (hypertension)     Hyperlipidemia     Hypertension

## 2021-10-03 NOTE — ED PROVIDER NOTE - OBJECTIVE STATEMENT
85yo M w/ PMH HTN, CHF BIB d/t abnormal behavior x 1wk. Pt not compliant w/ meds x 1wk, more agitated w/ daughter, pt pulled out all clothes from bedroom drawers and placed on bed / in dining room, daughter saw pt this AM standing outside w/o his slippers on. Daughter states pt is clean and organized, and this is not like him. Denies F/C, CP, SOB, cough, abd pain, back pain, N/V/D, constipation. Pt urinated on self in ED bed.     PMH as above, PSH none, NKDA, meds as listed.

## 2021-10-03 NOTE — H&P ADULT - HISTORY OF PRESENT ILLNESS
87 y/o M w/ PMHx HTN, HLD, Diastolic CHF, CKD stage 4, CVA presents to the ED due abnormal behavior x 1wk. Pt presently awake, alert oriented to self place and time however not to situation; states he knows he's in the Hospital however is unsure why. Tried calling family however unable to reach hx obtained from ED staff. Per ED daughter reported had not been compliant w/ meds x 1wk, and has been more agitated w/ her. Pt pulled out all clothes from bedroom drawers and placed on bed / in dining room, daughter saw pt this AM standing outside w/o his slippers on. Daughter stated pt is typically clean and organized, and this is not like him. Per ED staff no reported F/C, CP, SOB, cough, abd pain, back pain, N/V/D, constipation. Pt urinated on self in ED bed.     In ED initial  improved on own and rest of vitals stable. Labs appear to be at baseline compared to prior. CT head no acute pathology, UA, CXR neg, COVID neg.

## 2021-10-03 NOTE — ED ADULT TRIAGE NOTE - LOCATION:
Approved
Received call from Dr. Priscilla Chapman office requesting a referral for patient appt on 4/19/21. Pended referral. Please review diagnosis and sign off if you agree.     Thank you,  [facility] [phone]
Right arm;

## 2021-10-04 LAB
ALBUMIN SERPL ELPH-MCNC: 3.1 G/DL — LOW (ref 3.3–5)
ALP SERPL-CCNC: 82 U/L — SIGNIFICANT CHANGE UP (ref 40–120)
ALT FLD-CCNC: 27 U/L — SIGNIFICANT CHANGE UP (ref 12–78)
ANION GAP SERPL CALC-SCNC: 4 MMOL/L — LOW (ref 5–17)
AST SERPL-CCNC: 42 U/L — HIGH (ref 15–37)
BASOPHILS # BLD AUTO: 0.06 K/UL — SIGNIFICANT CHANGE UP (ref 0–0.2)
BASOPHILS NFR BLD AUTO: 0.9 % — SIGNIFICANT CHANGE UP (ref 0–2)
BILIRUB SERPL-MCNC: 1.3 MG/DL — HIGH (ref 0.2–1.2)
BUN SERPL-MCNC: 21 MG/DL — SIGNIFICANT CHANGE UP (ref 7–23)
CALCIUM SERPL-MCNC: 8.8 MG/DL — SIGNIFICANT CHANGE UP (ref 8.5–10.1)
CHLORIDE SERPL-SCNC: 110 MMOL/L — HIGH (ref 96–108)
CO2 SERPL-SCNC: 26 MMOL/L — SIGNIFICANT CHANGE UP (ref 22–31)
CREAT SERPL-MCNC: 2.02 MG/DL — HIGH (ref 0.5–1.3)
EOSINOPHIL # BLD AUTO: 0.35 K/UL — SIGNIFICANT CHANGE UP (ref 0–0.5)
EOSINOPHIL NFR BLD AUTO: 5.3 % — SIGNIFICANT CHANGE UP (ref 0–6)
GLUCOSE SERPL-MCNC: 106 MG/DL — HIGH (ref 70–99)
HCT VFR BLD CALC: 34.5 % — LOW (ref 39–50)
HGB BLD-MCNC: 11.3 G/DL — LOW (ref 13–17)
IMM GRANULOCYTES NFR BLD AUTO: 0.2 % — SIGNIFICANT CHANGE UP (ref 0–1.5)
LYMPHOCYTES # BLD AUTO: 0.86 K/UL — LOW (ref 1–3.3)
LYMPHOCYTES # BLD AUTO: 13.1 % — SIGNIFICANT CHANGE UP (ref 13–44)
MAGNESIUM SERPL-MCNC: 2.5 MG/DL — SIGNIFICANT CHANGE UP (ref 1.6–2.6)
MCHC RBC-ENTMCNC: 29.6 PG — SIGNIFICANT CHANGE UP (ref 27–34)
MCHC RBC-ENTMCNC: 32.8 GM/DL — SIGNIFICANT CHANGE UP (ref 32–36)
MCV RBC AUTO: 90.3 FL — SIGNIFICANT CHANGE UP (ref 80–100)
MONOCYTES # BLD AUTO: 0.69 K/UL — SIGNIFICANT CHANGE UP (ref 0–0.9)
MONOCYTES NFR BLD AUTO: 10.5 % — SIGNIFICANT CHANGE UP (ref 2–14)
NEUTROPHILS # BLD AUTO: 4.58 K/UL — SIGNIFICANT CHANGE UP (ref 1.8–7.4)
NEUTROPHILS NFR BLD AUTO: 70 % — SIGNIFICANT CHANGE UP (ref 43–77)
NRBC # BLD: 0 /100 WBCS — SIGNIFICANT CHANGE UP (ref 0–0)
PHOSPHATE SERPL-MCNC: 2.2 MG/DL — LOW (ref 2.5–4.5)
PLATELET # BLD AUTO: 230 K/UL — SIGNIFICANT CHANGE UP (ref 150–400)
POTASSIUM SERPL-MCNC: 3.5 MMOL/L — SIGNIFICANT CHANGE UP (ref 3.5–5.3)
POTASSIUM SERPL-SCNC: 3.5 MMOL/L — SIGNIFICANT CHANGE UP (ref 3.5–5.3)
PROT SERPL-MCNC: 6.3 GM/DL — SIGNIFICANT CHANGE UP (ref 6–8.3)
RBC # BLD: 3.82 M/UL — LOW (ref 4.2–5.8)
RBC # FLD: 15 % — HIGH (ref 10.3–14.5)
SODIUM SERPL-SCNC: 140 MMOL/L — SIGNIFICANT CHANGE UP (ref 135–145)
TSH SERPL-MCNC: 3.21 UIU/ML — SIGNIFICANT CHANGE UP (ref 0.36–3.74)
VIT B12 SERPL-MCNC: 211 PG/ML — LOW (ref 232–1245)
WBC # BLD: 6.55 K/UL — SIGNIFICANT CHANGE UP (ref 3.8–10.5)
WBC # FLD AUTO: 6.55 K/UL — SIGNIFICANT CHANGE UP (ref 3.8–10.5)

## 2021-10-04 PROCEDURE — 99233 SBSQ HOSP IP/OBS HIGH 50: CPT

## 2021-10-04 RX ORDER — ATORVASTATIN CALCIUM 80 MG/1
20 TABLET, FILM COATED ORAL AT BEDTIME
Refills: 0 | Status: DISCONTINUED | OUTPATIENT
Start: 2021-10-04 | End: 2021-10-07

## 2021-10-04 RX ORDER — BRINZOLAMIDE/BRIMONIDINE TARTRATE 10; 2 MG/ML; MG/ML
0 SUSPENSION/ DROPS OPHTHALMIC
Qty: 0 | Refills: 0 | DISCHARGE

## 2021-10-04 RX ORDER — BRIMONIDINE TARTRATE 2 MG/MG
0 SOLUTION/ DROPS OPHTHALMIC
Qty: 0 | Refills: 0 | DISCHARGE

## 2021-10-04 RX ORDER — BRIMONIDINE TARTRATE 2 MG/MG
1 SOLUTION/ DROPS OPHTHALMIC
Refills: 0 | Status: DISCONTINUED | OUTPATIENT
Start: 2021-10-04 | End: 2021-10-07

## 2021-10-04 RX ORDER — HYDRALAZINE HCL 50 MG
50 TABLET ORAL THREE TIMES A DAY
Refills: 0 | Status: DISCONTINUED | OUTPATIENT
Start: 2021-10-04 | End: 2021-10-07

## 2021-10-04 RX ORDER — ISOSORBIDE MONONITRATE 60 MG/1
30 TABLET, EXTENDED RELEASE ORAL DAILY
Refills: 0 | Status: DISCONTINUED | OUTPATIENT
Start: 2021-10-04 | End: 2021-10-07

## 2021-10-04 RX ORDER — ATORVASTATIN CALCIUM 80 MG/1
1 TABLET, FILM COATED ORAL
Qty: 0 | Refills: 0 | DISCHARGE

## 2021-10-04 RX ORDER — ISOSORBIDE MONONITRATE 60 MG/1
0 TABLET, EXTENDED RELEASE ORAL
Qty: 0 | Refills: 0 | DISCHARGE

## 2021-10-04 RX ORDER — ASPIRIN/CALCIUM CARB/MAGNESIUM 324 MG
81 TABLET ORAL DAILY
Refills: 0 | Status: DISCONTINUED | OUTPATIENT
Start: 2021-10-04 | End: 2021-10-07

## 2021-10-04 RX ORDER — NIFEDIPINE 30 MG
90 TABLET, EXTENDED RELEASE 24 HR ORAL DAILY
Refills: 0 | Status: DISCONTINUED | OUTPATIENT
Start: 2021-10-04 | End: 2021-10-07

## 2021-10-04 RX ORDER — HEPARIN SODIUM 5000 [USP'U]/ML
5000 INJECTION INTRAVENOUS; SUBCUTANEOUS EVERY 8 HOURS
Refills: 0 | Status: DISCONTINUED | OUTPATIENT
Start: 2021-10-04 | End: 2021-10-07

## 2021-10-04 RX ORDER — LABETALOL HCL 100 MG
200 TABLET ORAL
Refills: 0 | Status: DISCONTINUED | OUTPATIENT
Start: 2021-10-04 | End: 2021-10-07

## 2021-10-04 RX ORDER — DORZOLAMIDE HYDROCHLORIDE 20 MG/ML
1 SOLUTION/ DROPS OPHTHALMIC THREE TIMES A DAY
Refills: 0 | Status: DISCONTINUED | OUTPATIENT
Start: 2021-10-04 | End: 2021-10-07

## 2021-10-04 RX ORDER — INFLUENZA VIRUS VACCINE 15; 15; 15; 15 UG/.5ML; UG/.5ML; UG/.5ML; UG/.5ML
0.5 SUSPENSION INTRAMUSCULAR ONCE
Refills: 0 | Status: DISCONTINUED | OUTPATIENT
Start: 2021-10-04 | End: 2021-10-07

## 2021-10-04 RX ORDER — DORZOLAMIDE HYDROCHLORIDE 20 MG/ML
1 SOLUTION/ DROPS OPHTHALMIC
Qty: 0 | Refills: 0 | DISCHARGE

## 2021-10-04 RX ORDER — HYDRALAZINE HCL 50 MG
50 TABLET ORAL THREE TIMES A DAY
Refills: 0 | Status: DISCONTINUED | OUTPATIENT
Start: 2021-10-04 | End: 2021-10-04

## 2021-10-04 RX ORDER — LATANOPROST 0.05 MG/ML
1 SOLUTION/ DROPS OPHTHALMIC; TOPICAL AT BEDTIME
Refills: 0 | Status: DISCONTINUED | OUTPATIENT
Start: 2021-10-04 | End: 2021-10-07

## 2021-10-04 RX ADMIN — Medication 50 MILLIGRAM(S): at 06:56

## 2021-10-04 RX ADMIN — HEPARIN SODIUM 5000 UNIT(S): 5000 INJECTION INTRAVENOUS; SUBCUTANEOUS at 21:25

## 2021-10-04 RX ADMIN — Medication 50 MILLIGRAM(S): at 21:25

## 2021-10-04 RX ADMIN — DORZOLAMIDE HYDROCHLORIDE 1 DROP(S): 20 SOLUTION/ DROPS OPHTHALMIC at 21:25

## 2021-10-04 RX ADMIN — Medication 200 MILLIGRAM(S): at 06:56

## 2021-10-04 RX ADMIN — BRIMONIDINE TARTRATE 1 DROP(S): 2 SOLUTION/ DROPS OPHTHALMIC at 17:36

## 2021-10-04 RX ADMIN — HEPARIN SODIUM 5000 UNIT(S): 5000 INJECTION INTRAVENOUS; SUBCUTANEOUS at 05:31

## 2021-10-04 RX ADMIN — Medication 50 MILLIGRAM(S): at 14:51

## 2021-10-04 RX ADMIN — LATANOPROST 1 DROP(S): 0.05 SOLUTION/ DROPS OPHTHALMIC; TOPICAL at 22:07

## 2021-10-04 RX ADMIN — DORZOLAMIDE HYDROCHLORIDE 1 DROP(S): 20 SOLUTION/ DROPS OPHTHALMIC at 06:56

## 2021-10-04 RX ADMIN — Medication 200 MILLIGRAM(S): at 17:37

## 2021-10-04 RX ADMIN — ISOSORBIDE MONONITRATE 30 MILLIGRAM(S): 60 TABLET, EXTENDED RELEASE ORAL at 11:10

## 2021-10-04 RX ADMIN — BRIMONIDINE TARTRATE 1 DROP(S): 2 SOLUTION/ DROPS OPHTHALMIC at 06:56

## 2021-10-04 RX ADMIN — ATORVASTATIN CALCIUM 20 MILLIGRAM(S): 80 TABLET, FILM COATED ORAL at 21:25

## 2021-10-04 RX ADMIN — DORZOLAMIDE HYDROCHLORIDE 1 DROP(S): 20 SOLUTION/ DROPS OPHTHALMIC at 14:51

## 2021-10-04 RX ADMIN — Medication 81 MILLIGRAM(S): at 11:10

## 2021-10-04 RX ADMIN — Medication 90 MILLIGRAM(S): at 06:56

## 2021-10-04 RX ADMIN — Medication 10 MILLIGRAM(S): at 06:56

## 2021-10-04 RX ADMIN — HEPARIN SODIUM 5000 UNIT(S): 5000 INJECTION INTRAVENOUS; SUBCUTANEOUS at 14:51

## 2021-10-05 LAB
ANION GAP SERPL CALC-SCNC: 7 MMOL/L — SIGNIFICANT CHANGE UP (ref 5–17)
BUN SERPL-MCNC: 21 MG/DL — SIGNIFICANT CHANGE UP (ref 7–23)
CALCIUM SERPL-MCNC: 8.9 MG/DL — SIGNIFICANT CHANGE UP (ref 8.5–10.1)
CHLORIDE SERPL-SCNC: 107 MMOL/L — SIGNIFICANT CHANGE UP (ref 96–108)
CO2 SERPL-SCNC: 24 MMOL/L — SIGNIFICANT CHANGE UP (ref 22–31)
COVID-19 SPIKE DOMAIN AB INTERP: NEGATIVE — SIGNIFICANT CHANGE UP
COVID-19 SPIKE DOMAIN ANTIBODY RESULT: 0.4 U/ML — SIGNIFICANT CHANGE UP
CREAT SERPL-MCNC: 2.23 MG/DL — HIGH (ref 0.5–1.3)
CULTURE RESULTS: SIGNIFICANT CHANGE UP
GLUCOSE SERPL-MCNC: 116 MG/DL — HIGH (ref 70–99)
HCT VFR BLD CALC: 34.3 % — LOW (ref 39–50)
HGB BLD-MCNC: 11.1 G/DL — LOW (ref 13–17)
MCHC RBC-ENTMCNC: 29.5 PG — SIGNIFICANT CHANGE UP (ref 27–34)
MCHC RBC-ENTMCNC: 32.4 GM/DL — SIGNIFICANT CHANGE UP (ref 32–36)
MCV RBC AUTO: 91.2 FL — SIGNIFICANT CHANGE UP (ref 80–100)
NRBC # BLD: 0 /100 WBCS — SIGNIFICANT CHANGE UP (ref 0–0)
PLATELET # BLD AUTO: 222 K/UL — SIGNIFICANT CHANGE UP (ref 150–400)
POTASSIUM SERPL-MCNC: 3.5 MMOL/L — SIGNIFICANT CHANGE UP (ref 3.5–5.3)
POTASSIUM SERPL-SCNC: 3.5 MMOL/L — SIGNIFICANT CHANGE UP (ref 3.5–5.3)
RBC # BLD: 3.76 M/UL — LOW (ref 4.2–5.8)
RBC # FLD: 14.6 % — HIGH (ref 10.3–14.5)
SARS-COV-2 IGG+IGM SERPL QL IA: 0.4 U/ML — SIGNIFICANT CHANGE UP
SARS-COV-2 IGG+IGM SERPL QL IA: NEGATIVE — SIGNIFICANT CHANGE UP
SODIUM SERPL-SCNC: 138 MMOL/L — SIGNIFICANT CHANGE UP (ref 135–145)
SPECIMEN SOURCE: SIGNIFICANT CHANGE UP
WBC # BLD: 8.03 K/UL — SIGNIFICANT CHANGE UP (ref 3.8–10.5)
WBC # FLD AUTO: 8.03 K/UL — SIGNIFICANT CHANGE UP (ref 3.8–10.5)

## 2021-10-05 PROCEDURE — 99233 SBSQ HOSP IP/OBS HIGH 50: CPT

## 2021-10-05 RX ORDER — LANOLIN ALCOHOL/MO/W.PET/CERES
6 CREAM (GRAM) TOPICAL AT BEDTIME
Refills: 0 | Status: DISCONTINUED | OUTPATIENT
Start: 2021-10-05 | End: 2021-10-07

## 2021-10-05 RX ADMIN — Medication 6 MILLIGRAM(S): at 22:35

## 2021-10-05 RX ADMIN — LATANOPROST 1 DROP(S): 0.05 SOLUTION/ DROPS OPHTHALMIC; TOPICAL at 22:34

## 2021-10-05 RX ADMIN — BRIMONIDINE TARTRATE 1 DROP(S): 2 SOLUTION/ DROPS OPHTHALMIC at 17:38

## 2021-10-05 RX ADMIN — Medication 50 MILLIGRAM(S): at 14:12

## 2021-10-05 RX ADMIN — ISOSORBIDE MONONITRATE 30 MILLIGRAM(S): 60 TABLET, EXTENDED RELEASE ORAL at 11:17

## 2021-10-05 RX ADMIN — HEPARIN SODIUM 5000 UNIT(S): 5000 INJECTION INTRAVENOUS; SUBCUTANEOUS at 22:33

## 2021-10-05 RX ADMIN — DORZOLAMIDE HYDROCHLORIDE 1 DROP(S): 20 SOLUTION/ DROPS OPHTHALMIC at 22:33

## 2021-10-05 RX ADMIN — Medication 81 MILLIGRAM(S): at 11:17

## 2021-10-05 RX ADMIN — HEPARIN SODIUM 5000 UNIT(S): 5000 INJECTION INTRAVENOUS; SUBCUTANEOUS at 14:12

## 2021-10-05 RX ADMIN — Medication 90 MILLIGRAM(S): at 11:17

## 2021-10-05 RX ADMIN — Medication 10 MILLIGRAM(S): at 11:16

## 2021-10-05 RX ADMIN — Medication 200 MILLIGRAM(S): at 17:38

## 2021-10-05 RX ADMIN — ATORVASTATIN CALCIUM 20 MILLIGRAM(S): 80 TABLET, FILM COATED ORAL at 22:33

## 2021-10-05 RX ADMIN — DORZOLAMIDE HYDROCHLORIDE 1 DROP(S): 20 SOLUTION/ DROPS OPHTHALMIC at 14:13

## 2021-10-05 RX ADMIN — Medication 50 MILLIGRAM(S): at 22:33

## 2021-10-05 NOTE — CONSULT NOTE ADULT - ASSESSMENT
AMS  Vitamin B 12 deficiency  ?stroke history  HTN  HLD  CHF  CKD    - MRI brain wo marie  - replace vitamin B12; possibly the etiology of his memory loss and AMS  - aspirin and Lipitor for secondary stroke prevention  - will need outpatient follow up to determine if he has a type of dementia if his symptoms do not improve after vitamin B12 replacement.     Thank you for the consult.

## 2021-10-05 NOTE — CONSULT NOTE ADULT - SUBJECTIVE AND OBJECTIVE BOX
HPI: 86 year old man with hx of HTN, HLD, CHF, CKD, and stroke presenting with AMS. Patient has not been taking his medications as per his daughter for 1 week. He has become more agitated. Pt pulled out all clothes from bedroom drawers and placed on bed / in dining room, daughter saw pt this AM standing outside w/o his slippers on. Patient thinks this is true but does not recall. CT head and labs are unremarkable.     PMHx: HTN, HLD, CHF, CKD, and stroke  PSHx: None  Allergies: NKDA  Meds: See emr  FHx: none  ROS: AMS  Social Hx: non-smoker, no etoh, no illicit drug use    Vitals: Temp 97.4F    RR 18    HR 83    /71  General: NAD  Neuro Exam: AOx3. Follows commands. No dysarthria. No aphasia. EOM intact. 2/3 short term memory recall. PERRL. No facial droop. Tongue is midline. Palate elevates symmetrically. Shoulder shrug is intact. Moving all four extremities. Finger to nose and heel to shin intact. Reflexes diminished and toes down. Gait exam deferred.     CT head and labs reviewed.

## 2021-10-06 PROBLEM — I10 ESSENTIAL HYPERTENSION: Status: ACTIVE | Noted: 2017-03-22

## 2021-10-06 LAB
ANION GAP SERPL CALC-SCNC: 8 MMOL/L — SIGNIFICANT CHANGE UP (ref 5–17)
BUN SERPL-MCNC: 21 MG/DL — SIGNIFICANT CHANGE UP (ref 7–23)
CALCIUM SERPL-MCNC: 8.8 MG/DL — SIGNIFICANT CHANGE UP (ref 8.5–10.1)
CHLORIDE SERPL-SCNC: 105 MMOL/L — SIGNIFICANT CHANGE UP (ref 96–108)
CO2 SERPL-SCNC: 23 MMOL/L — SIGNIFICANT CHANGE UP (ref 22–31)
CREAT SERPL-MCNC: 2.15 MG/DL — HIGH (ref 0.5–1.3)
FOLATE SERPL-MCNC: 4.3 NG/ML — LOW
GLUCOSE SERPL-MCNC: 103 MG/DL — HIGH (ref 70–99)
HCT VFR BLD CALC: 35.7 % — LOW (ref 39–50)
HGB BLD-MCNC: 11.5 G/DL — LOW (ref 13–17)
MCHC RBC-ENTMCNC: 29.1 PG — SIGNIFICANT CHANGE UP (ref 27–34)
MCHC RBC-ENTMCNC: 32.2 GM/DL — SIGNIFICANT CHANGE UP (ref 32–36)
MCV RBC AUTO: 90.4 FL — SIGNIFICANT CHANGE UP (ref 80–100)
NRBC # BLD: 0 /100 WBCS — SIGNIFICANT CHANGE UP (ref 0–0)
PLATELET # BLD AUTO: 218 K/UL — SIGNIFICANT CHANGE UP (ref 150–400)
POTASSIUM SERPL-MCNC: 3.7 MMOL/L — SIGNIFICANT CHANGE UP (ref 3.5–5.3)
POTASSIUM SERPL-SCNC: 3.7 MMOL/L — SIGNIFICANT CHANGE UP (ref 3.5–5.3)
RBC # BLD: 3.95 M/UL — LOW (ref 4.2–5.8)
RBC # FLD: 14.5 % — SIGNIFICANT CHANGE UP (ref 10.3–14.5)
SODIUM SERPL-SCNC: 136 MMOL/L — SIGNIFICANT CHANGE UP (ref 135–145)
WBC # BLD: 7.21 K/UL — SIGNIFICANT CHANGE UP (ref 3.8–10.5)
WBC # FLD AUTO: 7.21 K/UL — SIGNIFICANT CHANGE UP (ref 3.8–10.5)

## 2021-10-06 PROCEDURE — 99233 SBSQ HOSP IP/OBS HIGH 50: CPT

## 2021-10-06 PROCEDURE — 70551 MRI BRAIN STEM W/O DYE: CPT | Mod: 26

## 2021-10-06 RX ORDER — PREGABALIN 225 MG/1
2000 CAPSULE ORAL DAILY
Refills: 0 | Status: DISCONTINUED | OUTPATIENT
Start: 2021-10-06 | End: 2021-10-07

## 2021-10-06 RX ORDER — FOLIC ACID 0.8 MG
1 TABLET ORAL DAILY
Refills: 0 | Status: DISCONTINUED | OUTPATIENT
Start: 2021-10-06 | End: 2021-10-07

## 2021-10-06 RX ADMIN — BRIMONIDINE TARTRATE 1 DROP(S): 2 SOLUTION/ DROPS OPHTHALMIC at 05:48

## 2021-10-06 RX ADMIN — HEPARIN SODIUM 5000 UNIT(S): 5000 INJECTION INTRAVENOUS; SUBCUTANEOUS at 15:39

## 2021-10-06 RX ADMIN — ISOSORBIDE MONONITRATE 30 MILLIGRAM(S): 60 TABLET, EXTENDED RELEASE ORAL at 12:38

## 2021-10-06 RX ADMIN — Medication 10 MILLIGRAM(S): at 05:48

## 2021-10-06 RX ADMIN — Medication 200 MILLIGRAM(S): at 17:42

## 2021-10-06 RX ADMIN — DORZOLAMIDE HYDROCHLORIDE 1 DROP(S): 20 SOLUTION/ DROPS OPHTHALMIC at 05:49

## 2021-10-06 RX ADMIN — ATORVASTATIN CALCIUM 20 MILLIGRAM(S): 80 TABLET, FILM COATED ORAL at 21:50

## 2021-10-06 RX ADMIN — HEPARIN SODIUM 5000 UNIT(S): 5000 INJECTION INTRAVENOUS; SUBCUTANEOUS at 21:50

## 2021-10-06 RX ADMIN — LATANOPROST 1 DROP(S): 0.05 SOLUTION/ DROPS OPHTHALMIC; TOPICAL at 21:50

## 2021-10-06 RX ADMIN — BRIMONIDINE TARTRATE 1 DROP(S): 2 SOLUTION/ DROPS OPHTHALMIC at 17:42

## 2021-10-06 RX ADMIN — Medication 50 MILLIGRAM(S): at 21:50

## 2021-10-06 RX ADMIN — Medication 90 MILLIGRAM(S): at 05:48

## 2021-10-06 RX ADMIN — DORZOLAMIDE HYDROCHLORIDE 1 DROP(S): 20 SOLUTION/ DROPS OPHTHALMIC at 21:50

## 2021-10-06 RX ADMIN — Medication 50 MILLIGRAM(S): at 15:39

## 2021-10-06 RX ADMIN — HEPARIN SODIUM 5000 UNIT(S): 5000 INJECTION INTRAVENOUS; SUBCUTANEOUS at 05:48

## 2021-10-06 RX ADMIN — Medication 81 MILLIGRAM(S): at 12:38

## 2021-10-06 RX ADMIN — Medication 50 MILLIGRAM(S): at 05:48

## 2021-10-06 RX ADMIN — DORZOLAMIDE HYDROCHLORIDE 1 DROP(S): 20 SOLUTION/ DROPS OPHTHALMIC at 15:39

## 2021-10-06 RX ADMIN — Medication 200 MILLIGRAM(S): at 05:48

## 2021-10-06 NOTE — PHYSICAL THERAPY INITIAL EVALUATION ADULT - DIAGNOSIS, PT EVAL
Pt presented with ability to perform tasks - bed mobility, transfers and ambulation both on level and stairs with no loss of balance, no assistive device.

## 2021-10-06 NOTE — PROGRESS NOTE ADULT - ASSESSMENT
1) AMS, Agitation  - unclear etiology  - per triage, pt w/ underlying Dementia reported to EMS;  -as per Son- pt left house and was missing, found by Police (this happened about a year and half ago), since then not driving anymore,  - CT head w/ parenchymal volume loss as well as scattered hypodensities in the periventricular white matter are nonspecific, but likely sequela of small vessel ischemic disease. Findings typically seen in pt's w/ Vascular dementia  - pt back to being AOx3, on/off confused as per nursing then back to being AOx3  -vitals stable  -awaiting Neuro evaluation  -vit B12, folate, RPR pending      2) HTN  - c/w home meds, continue to monitor     3) CVA  - c/w ASA and statin    4) CKD  - renal fxn at baseline  - cont to monitor    5) DVT ppx - HSQ    PT eval before discharged    Discussed with pt's Son- Mj - [phone]  
 85 y/o M w/ PMHx HTN, HLD, Diastolic CHF, CKD stage 4, CVA presents to the ED due abnormal behavior x 1wk. Pt presently awake, alert oriented to self place and time however not to situation; states he knows he's in the Hospital however is unsure why.     1) AMS, Agitation  - unclear etiology  - per triage, pt w/ underlying Dementia reported to EMS; unsure if ED obtain same hx   - CT head w/ parenchymal volume loss as well as scattered hypodensities in the periventricular white matter are nonspecific, but likely sequela of small vessel ischemic disease. Findings typically seen in pt's w/ Vascular dementia  - pt back to being AOx3,   -vitals stable    2) HTN  - c/w home meds, continue to montor, at times bit elevated    3) CVA  - c/w ASA and statin    4) CKD  - renal fxn at baseline  - cont to monitor    5) DVT ppx - HSQ    Dispo- continue to monitor, might need Neurology referral outpatient for dementia   
    1) AMS, Agitation  - unclear etiology  - per triage, pt w/ underlying Dementia reported to EMS;  -as per Son- pt left house and was missing, found by Police (this happened about a year and half ago), since then not driving anymore,  - CT head w/ parenchymal volume loss as well as scattered hypodensities in the periventricular white matter are nonspecific, but likely sequela of small vessel ischemic disease. Findings typically seen in pt's w/ Vascular dementia  - pt back to being AOx3, on/off confused as per nursing then back to being AOx3  -vitals stable  -vit B12 and folic acid replacement  -MRI of brain- showing pituitary adenoma -unchanged from march  Neurology outpatient follow up         2) HTN  - c/w home meds, continue to monitor     3) CVA  - c/w ASA and statin    4) CKD  - renal fxn at baseline  - cont to monitor    5) DVT ppx - HSQ    PT eval before discharged--HOME     Discussed with pt's Son- Mj - [phone]

## 2021-10-06 NOTE — PHYSICAL THERAPY INITIAL EVALUATION ADULT - CRITERIA FOR SKILLED THERAPEUTIC INTERVENTIONS
risk reduction/prevention/predicted duration of therapy intervention/anticipated discharge recommendation

## 2021-10-06 NOTE — PHYSICAL THERAPY INITIAL EVALUATION ADULT - LIVES WITH, PROFILE
Pt states he lives in pvt house with 2 dtrs and 2 sons, has 3 steps to enter the house with rails, 1 flight to go to 2nd floor bedroom.

## 2021-10-06 NOTE — PROGRESS NOTE ADULT - SUBJECTIVE AND OBJECTIVE BOX
Neurology Progress Note    No acute events.     Neuro Exam: AOx3. Follows commands. No dysarthria. No facial droop. PERRL. Moving all four extremities.     MRI brain- report pending  Vitamin B12- 211  Folate- 4.3    A/P:  AMS  Vitamin B 12 deficiency  Folate deficiency  ?stroke history  HTN  HLD  CHF  CKD    - follow up MRI brain report  - replace vitamin B12 and folate; possibly the etiology of his memory loss and AMS  - aspirin and Lipitor for secondary stroke prevention  - will need outpatient follow up to determine if he has a type of dementia if his symptoms do not improve after vitamin B12 and folate replacement
Patient is a 86y old  Male who presents with a chief complaint of AMS (04 Oct 2021 18:17)      INTERVAL HPI/OVERNIGHT EVENTS: Pt doing well, no complaints this morning, AOx3    MEDICATIONS  (STANDING):  aspirin enteric coated 81 milliGRAM(s) Oral daily  atorvastatin 20 milliGRAM(s) Oral at bedtime  brimonidine 0.2% Ophthalmic Solution 1 Drop(s) Both EYES two times a day  dorzolamide 2% Ophthalmic Solution 1 Drop(s) Both EYES three times a day  heparin   Injectable 5000 Unit(s) SubCutaneous every 8 hours  hydrALAZINE 50 milliGRAM(s) Oral three times a day  influenza   Vaccine 0.5 milliLiter(s) IntraMuscular once  isosorbide   mononitrate ER Tablet (IMDUR) 30 milliGRAM(s) Oral daily  labetalol 200 milliGRAM(s) Oral two times a day  latanoprost 0.005% Ophthalmic Solution 1 Drop(s) Both EYES at bedtime  NIFEdipine XL 90 milliGRAM(s) Oral daily  torsemide 10 milliGRAM(s) Oral daily    MEDICATIONS  (PRN):  acetaminophen   Tablet .. 650 milliGRAM(s) Oral every 6 hours PRN Temp greater or equal to 38C (100.4F), Mild Pain (1 - 3)  ondansetron Injectable 4 milliGRAM(s) IV Push every 8 hours PRN Nausea and/or Vomiting      Allergies    Allergy Status Unknown    Intolerances        REVIEW OF SYSTEMS:  CONSTITUTIONAL: No fever, weight loss, or fatigue  EYES: No eye pain, visual disturbances, or discharge  ENMT:  No difficulty hearing, tinnitus, vertigo; No sinus or throat pain  NECK: No pain or stiffness  BREASTS: No pain, masses, or nipple discharge  RESPIRATORY: No cough, wheezing, chills or hemoptysis; No shortness of breath  CARDIOVASCULAR: No chest pain, palpitations, dizziness, or leg swelling  GASTROINTESTINAL: No abdominal or epigastric pain. No nausea, vomiting, or hematemesis; No diarrhea or constipation. No melena or hematochezia.  GENITOURINARY: No dysuria, frequency, hematuria, or incontinence  NEUROLOGICAL: No headaches, memory loss, loss of strength, numbness, or tremors  SKIN: No itching, burning, rashes, or lesions   LYMPH NODES: No enlarged glands  ENDOCRINE: No heat or cold intolerance; No hair loss  MUSCULOSKELETAL: No joint pain or swelling; No muscle, back, or extremity pain  PSYCHIATRIC: No depression, anxiety, mood swings, or difficulty sleeping  HEME/LYMPH: No easy bruising, or bleeding gums  ALLERGY AND IMMUNOLOGIC: No hives or eczema    Vital Signs Last 24 Hrs  T(C): 36.7 (05 Oct 2021 12:21), Max: 37.2 (04 Oct 2021 17:12)  T(F): 98 (05 Oct 2021 12:21), Max: 99 (04 Oct 2021 17:12)  HR: 75 (05 Oct 2021 12:21) (67 - 81)  BP: 138/67 (05 Oct 2021 11:12) (133/69 - 165/71)  BP(mean): --  RR: 17 (05 Oct 2021 12:21) (17 - 20)  SpO2: 100% (05 Oct 2021 12:21) (98% - 100%)    PHYSICAL EXAM:  GENERAL: NAD, well-groomed, well-developed  HEAD:  Atraumatic, Normocephalic  EYES: EOMI, PERRLA, conjunctiva and sclera clear  ENMT: No tonsillar erythema, exudates, or enlargement; Moist mucous membranes, Good dentition, No lesions  NECK: Supple, No JVD, Normal thyroid  NERVOUS SYSTEM:  Alert & Oriented X3, Good concentration; Motor Strength 5/5 B/L upper and lower extremities; DTRs 2+ intact and symmetric  CHEST/LUNG: Clear to percussion bilaterally; No rales, rhonchi, wheezing, or rubs  HEART: Regular rate and rhythm; No murmurs, rubs, or gallops  ABDOMEN: Soft, Nontender, Nondistended; Bowel sounds present  EXTREMITIES:  2+ Peripheral Pulses, No clubbing, cyanosis, or edema  LYMPH: No lymphadenopathy noted  SKIN: No rashes or lesions    LABS:                        11.1   8.03  )-----------( 222      ( 05 Oct 2021 10:24 )             34.3     10-05    138  |  107  |  21  ----------------------------<  116<H>  3.5   |  24  |  2.23<H>    Ca    8.9      05 Oct 2021 10:24  Phos  2.2     10-04  Mg     2.5     10-04    TPro  6.3  /  Alb  3.1<L>  /  TBili  1.3<H>  /  DBili  x   /  AST  42<H>  /  ALT  27  /  AlkPhos  82  10-04      Urinalysis Basic - ( 03 Oct 2021 20:05 )    Color: Yellow / Appearance: Clear / S.010 / pH: x  Gluc: x / Ketone: Negative  / Bili: Negative / Urobili: 1 mg/dL   Blood: x / Protein: 30 mg/dL / Nitrite: Negative   Leuk Esterase: Negative / RBC: 0-2 /HPF / WBC x   Sq Epi: x / Non Sq Epi: x / Bacteria: Few      CAPILLARY BLOOD GLUCOSE          RADIOLOGY & ADDITIONAL TESTS:    Imaging Personally Reviewed:  [ ] YES  [ ] NO    Consultant(s) Notes Reviewed:  [ ] YES  [ ] NO    Care Discussed with Consultants/Other Providers [ ] YES  [ ] NO
Patient is a 86y old  Male who presents with a chief complaint of AMS (04 Oct 2021 18:17)      INTERVAL HPI/OVERNIGHT EVENTS: Pt doing well, no complaints this morning, AOx3, wants to go home.     MEDICATIONS  (STANDING):  aspirin enteric coated 81 milliGRAM(s) Oral daily  atorvastatin 20 milliGRAM(s) Oral at bedtime  brimonidine 0.2% Ophthalmic Solution 1 Drop(s) Both EYES two times a day  dorzolamide 2% Ophthalmic Solution 1 Drop(s) Both EYES three times a day  heparin   Injectable 5000 Unit(s) SubCutaneous every 8 hours  hydrALAZINE 50 milliGRAM(s) Oral three times a day  influenza   Vaccine 0.5 milliLiter(s) IntraMuscular once  isosorbide   mononitrate ER Tablet (IMDUR) 30 milliGRAM(s) Oral daily  labetalol 200 milliGRAM(s) Oral two times a day  latanoprost 0.005% Ophthalmic Solution 1 Drop(s) Both EYES at bedtime  NIFEdipine XL 90 milliGRAM(s) Oral daily  torsemide 10 milliGRAM(s) Oral daily    MEDICATIONS  (PRN):  acetaminophen   Tablet .. 650 milliGRAM(s) Oral every 6 hours PRN Temp greater or equal to 38C (100.4F), Mild Pain (1 - 3)  melatonin 6 milliGRAM(s) Oral at bedtime PRN Sleep  ondansetron Injectable 4 milliGRAM(s) IV Push every 8 hours PRN Nausea and/or Vomiting        Allergies    Allergy Status Unknown    Intolerances        REVIEW OF SYSTEMS:  CONSTITUTIONAL: No fever, weight loss, or fatigue  EYES: No eye pain, visual disturbances, or discharge  ENMT:  No difficulty hearing, tinnitus, vertigo; No sinus or throat pain  NECK: No pain or stiffness  BREASTS: No pain, masses, or nipple discharge  RESPIRATORY: No cough, wheezing, chills or hemoptysis; No shortness of breath  CARDIOVASCULAR: No chest pain, palpitations, dizziness, or leg swelling  GASTROINTESTINAL: No abdominal or epigastric pain. No nausea, vomiting, or hematemesis; No diarrhea or constipation. No melena or hematochezia.  GENITOURINARY: No dysuria, frequency, hematuria, or incontinence  NEUROLOGICAL: No headaches, memory loss, loss of strength, numbness, or tremors  SKIN: No itching, burning, rashes, or lesions   LYMPH NODES: No enlarged glands  ENDOCRINE: No heat or cold intolerance; No hair loss  MUSCULOSKELETAL: No joint pain or swelling; No muscle, back, or extremity pain  PSYCHIATRIC: No depression, anxiety, mood swings, or difficulty sleeping  HEME/LYMPH: No easy bruising, or bleeding gums  ALLERGY AND IMMUNOLOGIC: No hives or eczema    Vital Signs Last 24 Hrs  ICU Vital Signs Last 24 Hrs  T(C): 36.4 (06 Oct 2021 16:28), Max: 36.9 (06 Oct 2021 12:42)  T(F): 97.6 (06 Oct 2021 16:28), Max: 98.4 (06 Oct 2021 12:42)  HR: 70 (06 Oct 2021 16:28) (63 - 72)  BP: 120/65 (06 Oct 2021 16:28) (120/65 - 148/83)  BP(mean): --  ABP: --  ABP(mean): --  RR: 18 (06 Oct 2021 16:28) (17 - 18)  SpO2: 100% (06 Oct 2021 16:28) (98% - 100%)      PHYSICAL EXAM:  GENERAL: NAD, well-groomed, well-developed  HEAD:  Atraumatic, Normocephalic  EYES: EOMI, PERRLA, conjunctiva and sclera clear  ENMT: No tonsillar erythema, exudates, or enlargement; Moist mucous membranes, Good dentition, No lesions  NECK: Supple, No JVD  NERVOUS SYSTEM:  Alert & Oriented X3,  CHEST/LUNG: Clear to percussion bilaterally; No rales, rhonchi, wheezing, or rubs  HEART: Regular rate and rhythm; No murmurs, rubs, or gallops  ABDOMEN: Soft, Nontender, Nondistended; Bowel sounds present  EXTREMITIES:  2+ Peripheral Pulses, No clubbing, cyanosis, or edema  LYMPH: No lymphadenopathy noted  SKIN: No rashes or lesions    LABS:                                             11.5   7.21  )-----------( 218      ( 06 Oct 2021 07:57 )             35.7     10-06    136  |  105  |  21  ----------------------------<  103<H>  3.7   |  23  |  2.15<H>    Ca    8.8      06 Oct 2021 07:57          
Patient is a 86y old  Male who presents with a chief complaint of AMS (03 Oct 2021 23:11)      INTERVAL HPI/OVERNIGHT EVENTS: Pt doing well, states that came in to the hospital due to high blood pressure, denies any pain, or chest pain, sob, palpitations, burning on urination.     MEDICATIONS  (STANDING):  aspirin enteric coated 81 milliGRAM(s) Oral daily  atorvastatin 20 milliGRAM(s) Oral at bedtime  brimonidine 0.2% Ophthalmic Solution 1 Drop(s) Both EYES two times a day  dorzolamide 2% Ophthalmic Solution 1 Drop(s) Both EYES three times a day  heparin   Injectable 5000 Unit(s) SubCutaneous every 8 hours  hydrALAZINE 50 milliGRAM(s) Oral three times a day  influenza   Vaccine 0.5 milliLiter(s) IntraMuscular once  isosorbide   mononitrate ER Tablet (IMDUR) 30 milliGRAM(s) Oral daily  labetalol 200 milliGRAM(s) Oral two times a day  latanoprost 0.005% Ophthalmic Solution 1 Drop(s) Both EYES at bedtime  NIFEdipine XL 90 milliGRAM(s) Oral daily  torsemide 10 milliGRAM(s) Oral daily    MEDICATIONS  (PRN):  acetaminophen   Tablet .. 650 milliGRAM(s) Oral every 6 hours PRN Temp greater or equal to 38C (100.4F), Mild Pain (1 - 3)  ondansetron Injectable 4 milliGRAM(s) IV Push every 8 hours PRN Nausea and/or Vomiting      Allergies    Allergy Status Unknown    Intolerances        REVIEW OF SYSTEMS:  CONSTITUTIONAL: No fever, weight loss, or fatigue  EYES: No eye pain, visual disturbances, or discharge  ENMT:  No difficulty hearing, tinnitus, vertigo; No sinus or throat pain  NECK: No pain or stiffness  BREASTS: No pain, masses, or nipple discharge  RESPIRATORY: No cough, wheezing, chills or hemoptysis; No shortness of breath  CARDIOVASCULAR: No chest pain, palpitations, dizziness, or leg swelling  GASTROINTESTINAL: No abdominal or epigastric pain. No nausea, vomiting, or hematemesis; No diarrhea or constipation. No melena or hematochezia.  GENITOURINARY: No dysuria, frequency, hematuria, or incontinence  NEUROLOGICAL: No headaches, memory loss, loss of strength, numbness, or tremors  SKIN: No itching, burning, rashes, or lesions   LYMPH NODES: No enlarged glands  ENDOCRINE: No heat or cold intolerance; No hair loss  MUSCULOSKELETAL: No joint pain or swelling; No muscle, back, or extremity pain  PSYCHIATRIC: No depression, anxiety, mood swings, or difficulty sleeping  HEME/LYMPH: No easy bruising, or bleeding gums  ALLERGY AND IMMUNOLOGIC: No hives or eczema    Vital Signs Last 24 Hrs  T(C): 37.2 (04 Oct 2021 17:12), Max: 37.2 (04 Oct 2021 17:12)  T(F): 99 (04 Oct 2021 17:12), Max: 99 (04 Oct 2021 17:12)  HR: 76 (04 Oct 2021 17:12) (76 - 90)  BP: 149/75 (04 Oct 2021 17:12) (133/69 - 155/77)  BP(mean): --  RR: 20 (04 Oct 2021 17:12) (16 - 20)  SpO2: 99% (04 Oct 2021 17:12) (95% - 100%)    PHYSICAL EXAM:  GENERAL: NAD, well-groomed, well-developed  HEAD:  Atraumatic, Normocephalic  EYES: EOMI, PERRLA, conjunctiva and sclera clear  ENMT: No tonsillar erythema, exudates, or enlargement; Moist mucous membranes, Good dentition, No lesions  NECK: Supple, No JVD, Normal thyroid  NERVOUS SYSTEM:  Alert & Oriented X3, Good concentration; Motor Strength 5/5 B/L upper and lower extremities; DTRs 2+ intact and symmetric  CHEST/LUNG: Clear to percussion bilaterally; No rales, rhonchi, wheezing, or rubs  HEART: Regular rate and rhythm; No murmurs, rubs, or gallops  ABDOMEN: Soft, Nontender, Nondistended; Bowel sounds present  EXTREMITIES:  2+ Peripheral Pulses, No clubbing, cyanosis, or edema  LYMPH: No lymphadenopathy noted  SKIN: No rashes or lesions    LABS:                        11.3   6.55  )-----------( 230      ( 04 Oct 2021 08:49 )             34.5     10-04    140  |  110<H>  |  21  ----------------------------<  106<H>  3.5   |  26  |  2.02<H>    Ca    8.8      04 Oct 2021 08:49  Phos  2.2     10-  Mg     2.5     10-    TPro  6.3  /  Alb  3.1<L>  /  TBili  1.3<H>  /  DBili  x   /  AST  42<H>  /  ALT  27  /  AlkPhos  82  10-      Urinalysis Basic - ( 03 Oct 2021 20:05 )    Color: Yellow / Appearance: Clear / S.010 / pH: x  Gluc: x / Ketone: Negative  / Bili: Negative / Urobili: 1 mg/dL   Blood: x / Protein: 30 mg/dL / Nitrite: Negative   Leuk Esterase: Negative / RBC: 0-2 /HPF / WBC x   Sq Epi: x / Non Sq Epi: x / Bacteria: Few      CAPILLARY BLOOD GLUCOSE          RADIOLOGY & ADDITIONAL TESTS:    Imaging Personally Reviewed:  [ ] YES  [ ] NO    Consultant(s) Notes Reviewed:  [ ] YES  [ ] NO    Care Discussed with Consultants/Other Providers [ ] YES  [ ] NO

## 2021-10-06 NOTE — PHYSICAL THERAPY INITIAL EVALUATION ADULT - PERTINENT HX OF CURRENT PROBLEM, REHAB EVAL
Pt admitted due to AMS /abnormal behavior, Pmhx HTN, HLD, CHF, CKD stage 4, CT reading head 10/3: no acute intracranial  hemorrhage or mass effexct , Xray chest 10/3 mild R base atelectasis

## 2021-10-06 NOTE — PHYSICAL THERAPY INITIAL EVALUATION ADULT - GENERAL OBSERVATIONS, REHAB EVAL
Pt is alert, oriented x 4, follow directions on room air, not in distress, no complaints of pain, no dizziness, no lightheadedness.

## 2021-10-07 ENCOUNTER — TRANSCRIPTION ENCOUNTER (OUTPATIENT)
Age: 86
End: 2021-10-07

## 2021-10-07 VITALS
DIASTOLIC BLOOD PRESSURE: 73 MMHG | RESPIRATION RATE: 18 BRPM | OXYGEN SATURATION: 99 % | SYSTOLIC BLOOD PRESSURE: 136 MMHG | TEMPERATURE: 98 F | HEART RATE: 82 BPM

## 2021-10-07 LAB — T PALLIDUM AB TITR SER: NEGATIVE — SIGNIFICANT CHANGE UP

## 2021-10-07 PROCEDURE — 99239 HOSP IP/OBS DSCHRG MGMT >30: CPT

## 2021-10-07 RX ORDER — NIFEDIPINE 30 MG
1 TABLET, EXTENDED RELEASE 24 HR ORAL
Qty: 0 | Refills: 0 | DISCHARGE
Start: 2021-10-07

## 2021-10-07 RX ORDER — FOLIC ACID 0.8 MG
1 TABLET ORAL
Qty: 30 | Refills: 0
Start: 2021-10-07 | End: 2021-11-05

## 2021-10-07 RX ORDER — LABETALOL HCL 100 MG
1 TABLET ORAL
Qty: 0 | Refills: 0 | DISCHARGE
Start: 2021-10-07

## 2021-10-07 RX ORDER — HYDRALAZINE HCL 50 MG
1 TABLET ORAL
Qty: 0 | Refills: 0 | DISCHARGE
Start: 2021-10-07

## 2021-10-07 RX ORDER — PREGABALIN 225 MG/1
2 CAPSULE ORAL
Qty: 60 | Refills: 0
Start: 2021-10-07 | End: 2021-11-05

## 2021-10-07 RX ADMIN — Medication 10 MILLIGRAM(S): at 06:03

## 2021-10-07 RX ADMIN — HEPARIN SODIUM 5000 UNIT(S): 5000 INJECTION INTRAVENOUS; SUBCUTANEOUS at 06:03

## 2021-10-07 RX ADMIN — BRIMONIDINE TARTRATE 1 DROP(S): 2 SOLUTION/ DROPS OPHTHALMIC at 06:05

## 2021-10-07 RX ADMIN — Medication 50 MILLIGRAM(S): at 06:03

## 2021-10-07 RX ADMIN — Medication 90 MILLIGRAM(S): at 06:03

## 2021-10-07 RX ADMIN — DORZOLAMIDE HYDROCHLORIDE 1 DROP(S): 20 SOLUTION/ DROPS OPHTHALMIC at 06:03

## 2021-10-07 RX ADMIN — Medication 200 MILLIGRAM(S): at 06:03

## 2021-10-07 NOTE — DISCHARGE NOTE PROVIDER - PROVIDER TOKENS
PROVIDER:[TOKEN:[[id number]],FOLLOWUP:[1 week]],FREE:[LAST:[Primary Doctor],PHONE:[(   )    -],FAX:[(   )    -]]

## 2021-10-07 NOTE — DISCHARGE NOTE PROVIDER - CARE PROVIDER_API CALL
Zari Kimble  NEUROLOGY  [address] [place] [address]  Phone: [phone]  Fax: [phone]  Follow Up Time: 1 week    Primary Doctor,   Phone: (   )    -  Fax: (   )    -  Follow Up Time:

## 2021-10-07 NOTE — ED PROVIDER NOTE - NO PERTINENT FAMILY HISTORY
Care Transition Team Assessment    Spoke with patient at bedside. Lives with spouse, adult roommate and 4 children under 18 in [address] [place] [address]. PCP Mack Bernard. Uses walker @ time. Has [employer] insurance. Uses [facility]. Spouse will be ride @ D/C or roommate will be. Anticipate no needs @ present time.    Information Source  Orientation Level: Oriented X4  Information Given By: Patient    Readmission Evaluation  Is this a readmission?: No    Interdisciplinary Discharge Planning  Primary Care Physician: Mack Bernard  Lives with - Patient's Self Care Capacity: Spouse, Child Less than 18 Years of Age, Unrelated Adult  Support Systems: Parent, Spouse / Significant Other  Housing / Facility: [address] [place] [address]  Do You Take your Prescribed Medications Regularly: Yes  Able to Return to Previous ADL's: Other (Pending MS Flair)  Mobility Issues: Yes (At time when MS flairs.)  Prior Services: Home-Independent  Patient Prefers to be Discharged to:: Home  Assistance Needed: No  Durable Medical Equipment: Walker    Discharge Preparedness  What are your discharge supports?: Child, Parent, Spouse  Prior Functional Level: Ambulatory, Uses Walker    Functional Assesment  Prior Functional Level: Ambulatory, Uses Walker    Finances  Prescription Coverage: Yes    Anticipated Discharge Information  Discharge Disposition: Discharged to home/self care (01)         <<----- Click to add NO pertinent Family History

## 2021-10-07 NOTE — DISCHARGE NOTE PROVIDER - NSDCMRMEDTOKEN_GEN_ALL_CORE_FT
aspirin 81 mg oral delayed release tablet: 1 tab(s) orally once a day  atorvastatin 20 mg oral tablet: 1 tab(s) orally once a day (at bedtime)  brimonidine 0.2% ophthalmic solution: to each affected eye 2 times a day  cyanocobalamin 1000 mcg oral tablet: 2 tab(s) orally once a day  dorzolamide 2% ophthalmic solution: 1  to each affected eye 2 times a day  folic acid 1 mg oral tablet: 1 tab(s) orally once a day  hydrALAZINE 50 mg oral tablet: 1 tab(s) orally 3 times a day  ISOSORB MONO TAB 30M:   isosorbide mononitrate 30 mg oral tablet, extended release: 1 tab(s) orally once a day  labetalol 200 mg oral tablet: 1 tab(s) orally 2 times a day  latanoprost 0.005% ophthalmic solution: 1 drop(s) to each affected eye once a day (at bedtime)  NIFEdipine 90 mg oral tablet, extended release: 1 tab(s) orally once a day  SIMBRINZA 1%-0.2% EYE DROPS: instill 1 drop into both eyes twice a day  torsemide 10 mg oral tablet: 1 tab(s) orally once a day

## 2021-10-07 NOTE — DISCHARGE NOTE PROVIDER - NSDCCPCAREPLAN_GEN_ALL_CORE_FT
PRINCIPAL DISCHARGE DIAGNOSIS  Diagnosis: Altered mental status  Assessment and Plan of Treatment:

## 2021-10-07 NOTE — DISCHARGE NOTE PROVIDER - HOSPITAL COURSE
1) AMS, Agitation  - unclear etiology  - per triage, pt w/ underlying Dementia reported to EMS;  -as per Son- pt left house and was missing, found by Police (this happened about a year and half ago), since then not driving anymore,  - CT head w/ parenchymal volume loss as well as scattered hypodensities in the periventricular white matter are nonspecific, but likely sequela of small vessel ischemic disease. Findings typically seen in pt's w/ Vascular dementia  - pt back to being AOx3, on/off confused as per nursing then back to being AOx3  -vitals stable  -vit B12 and folic acid low- will replace on discharge- Son also aware pt will benefit best if receives B12 injections, will find out from PCP if can get in office.   -RPR-negative  -MRI of brain- showing pituitary adenoma -unchanged from March  Will follow with Neurology outpatient         2) HTN-stable  - c/w home meds, continue to monitor     3) Hx of CVA  - c/w ASA and statin    4) CKD  - renal fxn at baseline  - cont to monitor

## 2021-10-07 NOTE — DISCHARGE NOTE NURSING/CASE MANAGEMENT/SOCIAL WORK - NSDCVIVACCINE_GEN_ALL_CORE_FT
Bariatric Consult: Kay-en-Y Gastric Bypass    Medications evaluated as requested per Bariatric Consult.     No medication changes were needed based on the Bariatric Medication Management Policy.    The pharmacist will continue to follow as new medications are ordered.    John Ovalle  PharmD Student      
No Vaccines Administered.

## 2021-10-07 NOTE — DISCHARGE NOTE NURSING/CASE MANAGEMENT/SOCIAL WORK - PATIENT PORTAL LINK FT
You can access the FollowMyHealth Patient Portal offered by [facility] by registering at the following website: [email or web address]. By joining [facility]’s FollowMyHealth portal, you will also be able to view your health information using other applications (apps) compatible with our system.

## 2021-10-12 NOTE — PATIENT PROFILE ADULT. - ARE SIGNIFICANT INDICATORS COMPLETE.
Patient presented to the clinic after Physical therapy today, the therapist suggested some low pressure compression socks to be worn, she was thinking this might help him with the pain medication. Yes

## 2021-10-13 DIAGNOSIS — N18.4 CHRONIC KIDNEY DISEASE, STAGE 4 (SEVERE): ICD-10-CM

## 2021-10-13 DIAGNOSIS — F03.90 UNSPECIFIED DEMENTIA WITHOUT BEHAVIORAL DISTURBANCE: ICD-10-CM

## 2021-10-13 DIAGNOSIS — I50.30 UNSPECIFIED DIASTOLIC (CONGESTIVE) HEART FAILURE: ICD-10-CM

## 2021-10-13 DIAGNOSIS — D35.2 BENIGN NEOPLASM OF PITUITARY GLAND: ICD-10-CM

## 2021-10-13 DIAGNOSIS — E53.8 DEFICIENCY OF OTHER SPECIFIED B GROUP VITAMINS: ICD-10-CM

## 2021-10-13 DIAGNOSIS — E78.5 HYPERLIPIDEMIA, UNSPECIFIED: ICD-10-CM

## 2021-10-13 DIAGNOSIS — Z86.73 PERSONAL HISTORY OF TRANSIENT ISCHEMIC ATTACK (TIA), AND CEREBRAL INFARCTION WITHOUT RESIDUAL DEFICITS: ICD-10-CM

## 2021-10-13 DIAGNOSIS — R41.82 ALTERED MENTAL STATUS, UNSPECIFIED: ICD-10-CM

## 2021-10-13 DIAGNOSIS — Z79.82 LONG TERM (CURRENT) USE OF ASPIRIN: ICD-10-CM

## 2021-10-13 DIAGNOSIS — I13.0 HYPERTENSIVE HEART AND CHRONIC KIDNEY DISEASE WITH HEART FAILURE AND STAGE 1 THROUGH STAGE 4 CHRONIC KIDNEY DISEASE, OR UNSPECIFIED CHRONIC KIDNEY DISEASE: ICD-10-CM

## 2021-10-13 DIAGNOSIS — R45.1 RESTLESSNESS AND AGITATION: ICD-10-CM

## 2021-10-16 DIAGNOSIS — Z20.822 CONTACT WITH AND (SUSPECTED) EXPOSURE TO COVID-19: ICD-10-CM

## 2023-01-01 ENCOUNTER — EMERGENCY (EMERGENCY)
Facility: HOSPITAL | Age: 88
LOS: 1 days | Discharge: EXPIRED | End: 2023-01-01
Attending: EMERGENCY MEDICINE | Admitting: EMERGENCY MEDICINE
Payer: COMMERCIAL

## 2023-01-01 VITALS — OXYGEN SATURATION: 50 % | TEMPERATURE: 97 F

## 2023-01-01 PROCEDURE — 92950 HEART/LUNG RESUSCITATION CPR: CPT

## 2023-01-01 PROCEDURE — 99285 EMERGENCY DEPT VISIT HI MDM: CPT | Mod: 25

## 2023-01-01 RX ORDER — SODIUM BICARBONATE 1 MEQ/ML
50 SYRINGE (ML) INTRAVENOUS ONCE
Refills: 0 | Status: COMPLETED | OUTPATIENT
Start: 2023-01-01 | End: 2023-01-01

## 2023-01-01 RX ORDER — EPINEPHRINE 0.3 MG/.3ML
1 INJECTION INTRAMUSCULAR; SUBCUTANEOUS ONCE
Refills: 0 | Status: COMPLETED | OUTPATIENT
Start: 2023-01-01 | End: 2023-01-01

## 2023-01-01 RX ORDER — CALCIUM CHLORIDE
1000 POWDER (GRAM) MISCELLANEOUS ONCE
Refills: 0 | Status: COMPLETED | OUTPATIENT
Start: 2023-01-01 | End: 2023-01-01

## 2023-01-01 RX ADMIN — EPINEPHRINE 1 MILLIGRAM(S): 0.3 INJECTION INTRAMUSCULAR; SUBCUTANEOUS at 15:32

## 2023-01-01 RX ADMIN — Medication 50 MILLIEQUIVALENT(S): at 15:32

## 2023-01-01 RX ADMIN — Medication 50 MILLIGRAM(S): at 15:30

## 2023-01-01 RX ADMIN — EPINEPHRINE 1 MILLIGRAM(S): 0.3 INJECTION INTRAMUSCULAR; SUBCUTANEOUS at 15:35

## 2023-01-01 RX ADMIN — Medication 50 MILLIGRAM(S): at 14:23

## 2023-02-07 NOTE — PATIENT PROFILE ADULT - HISTORY OF COVID-19 VACCINATION
PSYCHIATRY INPATIENT DISCHARGE SUMMARY        Patient: Monique Chaparro Medical Record Number: [id number]   Admit Date: 2/6/2023 YOB: 1966, 56 year old   Discharge Date: 02/07/23 Attending: Juan Manuel Castillo DO       TIME SPENT ON DISCHARGE: Greater than 30 minutes spent with patient reviewing discharge medications and plan, reconciling medications, coordinating care, and documentation.           DISCHARGE DIAGNOSIS:   Mood disorder with psychosis    PAST MEDICAL HISTORY:  Past Medical History:   Diagnosis Date   • Anxiety    • Bipolar I disorder, most recent episode (or current) manic, severe, specified as with psychotic behavior 06/08/2007    adm Cuauhtemoc Garcia at [facility]   • Depression    • Essential hypertension, benign 04/25/2007   • Other and unspecified hyperlipidemia 09/18/2006   • Type 2 diabetes mellitus (CMS/Formerly [facility]) 08/2006   • Unspecified hypothyroidism 01/01/1990   • Urinary incontinence        HISTORY OF PRESENT ILLNESS: Please see psychiatric evaluation on day of admission.    Significant Findings from Mental Status Exam at the time of admission: Please see initial assessment for additional details.    CONSULTATIONS: History and Physical Examination - refer to consultation.    REASON FOR HOSPITALIZATION: SI    HOSPITAL COURSE:    The patient was admitted under formal voluntary conditions to unit 4 for safety and stabilization and provided with a safe and supportive environment, group and milieu therapies.  Patient was placed on safety precautions. Patient had basic lab work completed with results listed below. Patient was seen here by internist team for history and physical and did not have any acute medication issues during stay. No indication for COWS or CIWA.    When admitted, triage doctor continued most PTA meds. Patient VPA level was 42, but she reports good adherence since her CM brings her meds daily. Within 24 hours patient says she felt better, didn't like that she couldn't  have a radio on the unit and requested AMA discharge.     Pt signed an AGAINST MEDICAL ADVICE discharge request on 2/7/2023. Patient had capacity to make informed decision and sign AMA request. Patient refused to continue hospitalization and treatment. Patient was informed of high likely risks of relapse, decompensation, suffering and potentially death if discharged at this time. Patient was intact with reality, able to care for basic needs and was not at imminent risk of harming themselves or others, thus was not certifiable in mental health court, and therefore was discharged to their wishes on 2/7/2023 against medical advice. No grounds for emergency half-way.      CONDITION OF PATIENT ON DISCHARGE:  Patient was seen and evaluated on date of discharge. Mood, anxiety, appetite and sleep improved prior to discharge, rather quickly. Thought content was negative for suicidal ideation, homicidal ideation, audiovisual hallucinations. Insight and judgment were improved. Patient had reasonable discharge plan. Therefore, patient was sufficiently stabilized to step down to lower level of care. Patient was able to care for basic needs. Patient was NOT at imminent risk of suicide or homicide and denies having access to firearms.  Patient was able to voice that they would call 911 or go to the nearest emergency room if any suicidal ideation, homicidal ideation, manic, or psychotic symptoms were to occur, or if they felt unsafe for any reason.     VITAL SIGNS: Patient's vital signs were reviewed.  Vitals:    02/06/23 2051 02/06/23 2200 02/07/23 0725 02/07/23 0727   BP: 124/80  124/71 124/72   BP Location: LUE - Left upper extremity  LUE - Left upper extremity LUE - Left upper extremity   Patient Position: Sitting  Sitting Standing   Pulse: 87  75 77   Resp: 16  18    Temp: 98.3 °F (36.8 °C)  98.7 °F (37.1 °C)    TempSrc: Oral  Oral    SpO2: 95%  96%    Weight:  103.1 kg (227 lb 4.7 oz)     Height:  5' 4\" (1.626 m)          CIWA Scores:   No data found.    FLAVIO    PHQ9      LABS AND DIAGNOSTICS DONE DURING THIS ADMISSION:    Results for orders placed or performed during the hospital encounter of 02/06/23   Thyroid Stimulating Hormone Reflex   Result Value    TSH 3.322     Comment: Findings most consistent with euthyroid state, no additional testing suggested. TSH may be normal in patients with thyroid dysfunction and pituitary disease. Clinical correlation recommended.    (Reflex TSH algorithm is not recommended in hospitalized patients. A variety of drugs, as well as serious acute and chronic illnesses may alter thyroid function tests. Commonly implicated drugs include glucocorticoids, dopamine, carbamazepine, iodine, amiodarone, lithium and heparin.)   Comprehensive Metabolic Panel   Result Value    Fasting Status     Sodium 141    Potassium 4.1    Chloride 105    Carbon Dioxide 32    Anion Gap 8    Glucose 192 (H)    BUN 17    Creatinine 0.60    Glomerular Filtration Rate >90     Comment: eGFR results = or >60 mL/min/1.73m2 = Normal kidney function. Estimated GFR calculated using the CKD-EPI-R (2021) equation that does not include race in the creatinine calculation.    BUN/ Creatinine Ratio 28 (H)    Calcium 8.8    Bilirubin, Total 0.3    GOT/AST <5    GPT/ALT 11    Alkaline Phosphatase 68    Albumin 3.0 (L)    Protein, Total 6.2 (L)    Globulin 3.2    A/G Ratio 0.9 (L)   Valproic Acid   Result Value    Valproate 42 (L)     Comment: Psychiatric Reference Range:  50 to 125 mcg/mL.   CBC with Automated Differential (performable only)   Result Value    WBC 4.6    RBC 4.13    HGB 12.9    HCT 38.3    MCV 92.7    MCH 31.2    MCHC 33.7    RDW-CV 13.3    RDW-SD 44.8        NRBC 0    Neutrophil, Percent 32    Lymphocytes, Percent 48    Mono, Percent 13    Eosinophils, Percent 4    Basophils, Percent 2    Immature Granulocytes 1    Absolute Neutrophils 1.5 (L)    Absolute Lymphocytes 2.2    Absolute Monocytes 0.6    Absolute  Eosinophils  0.2    Absolute Basophils 0.1    Absolute Immmature Granulocytes 0.1   Rapid SARS-CoV-2 by PCR    Specimen: Nasal, Mid-turbinate; Swab   Result Value    Rapid SARS-COV-2 by PCR Not Detected    Isolation Guidelines      Comment: Do not use this test result as the sole decision-maker for discontinuation of isolation.   Clinical evaluation should be considered for other respiratory illness requiring transmission-based isolation.    -    No fever (<99.0 F/37.2 C) for at least 24 hours without the use of fever-reducing medications    AND  -    Respiratory symptoms have improved or resolved (e.g. cough, shortness of breath)     AND  -    COVID-19 negative test    See COVID-19 Deisolation Resource Guide    Procedural Comment      Comment: SARS-CoV-2 nucleic acid has not been detected.     Testing was performed using the [employer] Xpert RT-PCR assay that has been given Emergency Use Authorization (EUA) by the [place] Food and Drug Administration (FDA). These results are considered definitive and do not need to be confirmed by another method.   GLUCOSE, BEDSIDE - POINT OF CARE   Result Value    GLUCOSE, BEDSIDE - POINT OF CARE 400 (H)   GLUCOSE, BEDSIDE - POINT OF CARE   Result Value    GLUCOSE, BEDSIDE - POINT OF CARE 180 (H)     Comment: Notified RN   GLUCOSE, BEDSIDE - POINT OF CARE   Result Value    GLUCOSE, BEDSIDE - POINT OF CARE 246 (H)         Point of Care Breath Alcohol testing result :     Point of Care Urine Drug Screen Results         [unfilled]    URINE PREGNANCY,QUAL (no units)   Date Value   02/11/2014 negative         No results found for: GGTP  Lab Results   Component Value Date    MG 1.4 (L) 08/17/2022     TSH (mcUnits/mL)   Date Value   02/07/2023 3.322     T4, Free (ng/dL)   Date Value   12/28/2022 0.6 (L)     No results found for: T3FREE    No results found for: LITHIUM  Lab Results   Component Value Date    VALP 42 (L) 02/07/2023    VALP 42 (L) 04/15/2022    VALP 68 02/21/2022     No  results found for: CARBAM    CPK   Date Value   06/28/2018 75 Units/L   06/12/2018 143 Units/L   04/12/2018 83 Units/L   11/26/2007 96 U/L     CK (Units/L)   Date Value   09/04/2021 72   12/10/2020 80   11/07/2020 55       Hemoglobin A1C (%)   Date Value   12/28/2022 8.6 (H)       LIPID PANEL  Cholesterol (mg/dL)   Date Value   12/28/2022 239 (H)      HDL (mg/dL)   Date Value   12/28/2022 64      Cholesterol/ HDL Ratio (no units)   Date Value   12/28/2022 3.7      Triglycerides (mg/dL)   Date Value   12/28/2022 254 (H)      LDL (mg/dL)   Date Value   12/28/2022 124       EKG:     Diagnostic results:  XR CHEST AP OR PA    Result Date: 2/1/2023  Narrative: Examination: Chest radiograph, 1 view. Clinical Information: Chest pain. Comparison: 12/11/2022. Findings: Support tubes and lines: None. Heart, mediastinum, and pulmonary vasculature: The cardiomediastinal silhouette is within normal limits for size and contour. No pulmonary vascular congestion. Lungs and pleura: No pulmonary consolidation or edema. No pleural effusion. No pneumothorax. Symmetric haziness in the lateral lung bases favored artifactual from soft tissue overlap. Bones: No acute abnormality.    Impression: No acute cardiopulmonary process.    US Upper Extremity Venous Duplex Right    Result Date: 1/14/2023  Narrative: HISTORY: Right upper extremity swelling and pain. EXAM: ULTRASOUND DUPLEX VENOUS OF THE RIGHT UPPER EXTREMITY. COMPARISON: None. TECHNIQUE: Gray-scale, color Doppler, and spectral waveform imaging of the deep venous system of the right upper arm and the right internal jugular vein was performed. FINDINGS: There is normal compression, normal venous waveforms, and normal color flow in the internal jugular vein. There is normal color flow, normal augmentation, normal compressibility (where applicable), and normal venous waveforms in the right subclavian vein, the right axillary vein, the right basilic vein, right brachial vein, and the right  cephalic vein. There is no evidence for DVT.     Impression: IMPRESSION: No evidence for DVT involving the deep venous system of the right upper extremity or the right internal jugular vein.    DX Knee 1-2V Rt SS    Result Date: 2/1/2023  Narrative: CLINICAL HISTORY:   fall COMPARISON:   None.   PROCEDURE:   Two views of the right knee. FINDINGS: Normal alignment. No visible fracture. No joint effusion. Medial and patellofemoral osteophytosis.    Impression: No acute findings. Degenerative changes to the knee             MENTAL STATUS EXAM ON DISCHARGE TODAY:  Please refer to MSE in same day psych evaluation    DISCHARGE MEDICATIONS: Continue taking current psychotropic medications as prescribed.     Summary of your Discharge Medications      Take these Medications      Details   atorvastatin 40 MG tablet  Commonly known as: LIPITOR   Take 40 mg by mouth daily.     blood glucose test strip  Commonly known as: OneTouch Ultra   Test blood sugar 2 times daily.     divalproex 500 MG 24 hr ER tablet  Commonly known as: DEPAKOTE ER   Take 3 tablets by mouth daily.     escitalopram 20 MG tablet  Commonly known as: LEXAPRO   Take 20 mg by mouth daily.     levothyroxine 150 MCG tablet   Take 1 tablet by mouth daily. On an empty stomach  Comment: Prescribed while inpatient at [facility].  Refills needed.  Thanks!     lurasidone 120 MG tablet  Commonly known as: LATUDA   Take 120 mg by mouth daily (at noon).     metFORMIN 500 MG 24 hr tablet  Commonly known as: GLUCOPHAGE-XR   Take 2 tablets by mouth in the morning and 2 tablets in the evening. Take with meals.     mirtazapine 45 MG tablet  Commonly known as: REMERON   TAKE 1 TABLET BY MOUTH DAILY AT BEDTIME     ONE TOUCH ULTRA 2 w/Device Kit   Use for BG testing     OneTouch Delica Lancets 30G Misc   1 Units 2 times daily.     Vitamin D3 50 mcg (2,000 units) capsule   Take 50 mcg by mouth daily.            Patient is on two or more scheduled antipsychotic agents:No      TOBACCO CESSATION BEST PRACTICE:   Patient is a non-smoker/non-tobacco user.  Social History     Tobacco Use   • Smoking status: Never   • Smokeless tobacco: Never   Substance Use Topics   • Alcohol use: Not Currently       DISCHARGE DISPOSITION:  discharged AMA    Activity Level: as tolerated  Diet: General  Legal: Voluntary    Patient has a substance use disorder diagnosis listed: No.    - Next Level of Care Recommended: Mental Health Outpatient Program.     - Patient Response: Accepts  will continue with OP team     DISCHARGE INSTRUCTIONS: Discharge medications, followup appointments, safety plan, and importance of adherence to treatment were reviewed with patient who understood and agreed with discharge plans. Sobriety from alcohol, illicit drugs or tobacco was encouraged for optimal treatment outcome. I emphasized to the patient that her medical condition requires continuing physician supervision, and it is important that she keeps scheduled appointment with providers as noted on After Visit Summary.  I informed the patient that I will provide her with an adequate amount of medications - none, no changes made - to allow time for establishment of outpatient care, but after that time I will not continue to provide care or refills. Patient informed that I reserve the right to deny any refill request as patient is no longer hospitalized and no longer under my care once discharged.     I advised the patient that if she misses scheduled outpatient appointment and has an emergent psychiatric need, she should obtain assessment at the nearest emergency department. Patient was given the opportunity to ask questions. There were no educational barriers to learning and all questions were answered. Patient verbalized understanding of treatment contract and acceptance of potential risks and consequences should she miss followup appointment.     Juan Manuel Castillo DO  2/7/2023  12:28 PM      This information is confidential  and disclosure without patient consent or statutory authorization is prohibited by law.     No

## 2023-03-20 NOTE — ED ADULT NURSE NOTE - NSIMPLEMENTINTERV_GEN_ALL_ED
Implemented All Fall with Harm Risk Interventions:  [place] to call system. Call bell, personal items and telephone within reach. Instruct patient to call for assistance. Room bathroom lighting operational. Non-slip footwear when patient is off stretcher. Physically safe environment: no spills, clutter or unnecessary equipment. Stretcher in lowest position, wheels locked, appropriate side rails in place. Provide visual cue, wrist band, yellow gown, etc. Monitor gait and stability. Monitor for mental status changes and reorient to person, place, and time. Review medications for side effects contributing to fall risk. Reinforce activity limits and safety measures with patient and family. Provide visual clues: red socks.

## 2023-03-20 NOTE — ED ADULT NURSE NOTE - OBJECTIVE STATEMENT
Joycelyn RN  received pt in bed brought in by EMS with chest compression in progress via Keith compression device, pt transferred to stretcher placed on monitor with asystole noted, Io in place right tibia, pt si intubated with ETT 28 cm mid lip line, ACLS protocol resumed, ( see flow sheet for medications and interventions list) IV initiated 20 G left forearm, labs drawn and sent,  after multiple rounds of compressions and medications administration no ROSC observed pt pronounced dead by Dr. Teague at 1450. Organ donation line called, post mortem care performed, pending family  arrival.

## 2023-03-20 NOTE — ED PROVIDER NOTE - PROGRESS NOTE DETAILS
Lizandro, PGY-3  TOD called at 2:50. Spoke with ME office. Spoke with  who reports no case number issued but number in regard to case; [id number]

## 2023-03-20 NOTE — ED PROVIDER NOTE - OBJECTIVE STATEMENT
87-year-old male history of hypertension, CKD presents to the emergency department cardiac arrest.  Patient was found unresponsive sitting on the toilet around 1:36 PM today.  History provided by daughter, states that patient went to the bathroom around 130 and he did not come out so she went to check on him and found him unresponsive sitting on the toilet, no blood noted anywhere.  She called 911, EMS placed an intraosseous line, intubated in the field, patient never had a shockable rhythm, mostly asystole with a few episodes of PEA.  Received epinephrine x4, bicarb, arrived on dopamine drip with ongoing CPR.  Daughter states that patient was in his usual state of health and did not complain of anything prior to episode.

## 2023-03-20 NOTE — ED PROCEDURE NOTE - PROCEDURE ADDITIONAL DETAILS
FAST: without evidence of intraperitoneal free fluid.   TTE: no pericardial effusion, cardiac standstill.

## 2023-03-20 NOTE — ED PROVIDER NOTE - ATTENDING CONTRIBUTION TO CARE
Upon my evaluation, this patient had a high probability of imminent or life-threatening deterioration due to concern for cadiac arrest, which required my direct attention, intervention, and personal management.  The patient has a  medical condition that impairs one or more vital organ systems.  Frequent personal assessment and adjustment of medical interventions was performed.       I have personally provided 30 minutes of critical care time exclusive of time spent on separately billable procedures. Time includes review of laboratory data, radiology results, discussion with consultants, patient and family; monitoring for potential decompensation, as well as time spent retrieving data and reviewing the chart and documenting the visit. Interventions were performed as documented above.

## 2023-03-20 NOTE — ED ADULT NURSE REASSESSMENT NOTE - NS ED NURSE REASSESS COMMENT FT1
valuables: wallet, cellphone and cash sealed in envelope and sent to security. all clothing were cut up during resuscitation. thrown away.

## 2023-03-20 NOTE — ED PROVIDER NOTE - CLINICAL SUMMARY MEDICAL DECISION MAKING FREE TEXT BOX
87-year-old male history of hypertension, CKD presents the emergency department in cardiac arrest.  Patient was found sitting on toilet unresponsive by his daughter.  She called 911, per EMS report patient has no pulse CPR initiated, intubated in the field, given epi, bicarb, on dopamine drip, at all pulse checks never had a shockable rhythm.  On arrival to the ED ongoing CPR, patient given additional doses of epi, calcium, bicarb without any return of spontaneous circulation.  Bedside ultrasound showing no evidence of pericardial effusion or dilated ventricle, fingerstick in the field was approximately 244.  Given patient had a downtime of over 1 hour and no response to any ACLS medications, patient was pronounced dead at 2:50 PM.  Daughter arrived to ED with patient, made aware of patient's death, states it is too overwhelming for her to be in the hospital and left to go home.  Social work provided patient's daughter with her phone number should she need any assistance.

## 2023-11-17 NOTE — DISCHARGE NOTE PROVIDER - NS AS DC PROVIDER CONTACT Y/N MULTI
Yes
impairments found/functional limitations in following categories/risk reduction/prevention/rehab potential/therapy frequency/predicted duration of therapy intervention/anticipated equipment needs at discharge/anticipated discharge recommendation

## 2024-01-01 NOTE — H&P ADULT. - HISTORY OF PRESENT ILLNESS
81M self ambulating, with of HTN has been experiencing progressive SOB over the past few months and abdominal bloating.  He denies dietary indiscretions with salt foods and recent weigh gain. He is able to ambulate a few blocks before experiencing dyspnea. Positive dry cough. No chest pain, palpitations, nausea, vomit, chills, diaphoresis.
Statement Selected

## 2024-05-28 NOTE — ED ADULT TRIAGE NOTE - NS ED NURSE DIRECT TO ROOM YN
Initiate Treatment: Muipirocin to affected area on right abdomen twice daily until healed
No
Render In Strict Bullet Format?: No
Detail Level: Detailed

## 2024-07-11 NOTE — PHYSICAL THERAPY INITIAL EVALUATION ADULT - ADDITIONAL COMMENTS
Evaluated at bedside on multiple occasions this morning due to new on set afib RVR, swallowing concerns, hypotension. Patient this morning alert but not oriented (baseline). BP acceptable. Given lopressor overnight and this morning for afib with RVR overnight. Rates showed some improvement. Patient was not complaining of chest pain or shortness of breath. Called by RN shortly after AM assessment due to difficulty with swallowing, SLP re-evaluated and rec'd video swallow study depending on how aggressive care was to be. I called POA and other listed health care agents each multiple times which went straight to voicemail. I left numbers to call back but unfortunately we could not reach anyone. I was called again this morning by RN noting low BP 70s/40s requiring patient to be placed in trendelenburg positioning to be able to meausure BP. Given hx of sever pHTN I ordered a 500cc bolus and asked for a BP re-check at which time was still low 80s/60s, I instructed RN to complete the full 1L bolus. After completion BP was re-checked manually and still 70s/50s. At this time we could also not get an accurate pulse oximetry reading on the patient and it seemed she was struggling from a respiratory standpoint. I again called the SDM on multiple occasions and it went straight to voicemail. Given the patient was still listed as a full code status the case was discussed with the ICU physician and she was transferred for further care.    Critical care time: 42 minutes    Luz Maria Hawkins MD   Pt states he does not use any walking device when ambulating

## 2024-08-07 NOTE — H&P ADULT - PROBLEM SELECTOR PLAN 1
Attending to bill Attending to bill Attending to bill Attending to bill Attending to bill Attending to bill Attending to bill Attending to bill Attending to bill Attending to bill Attending to bill Attending to bill Attending to bill Received Aspirin 325 mg PO.  Continue 81 mg   Monitor BP Closely, to maintain -100/ DBP 60-90 (MAP ).  ECHO, carotid doppler in am.  Lipitor 20 mg PO QHS.  Neurology on board- Dr Kimble     PT Evaluation.  Speech & swallow evaluation in am.

## 2025-02-06 NOTE — ED ADULT NURSE NOTE - TEMPLATE LIST FOR HEAD TO TOE ASSESSMENT
Patient informed of results, prescription called in at [facility] outpatient pharmacy, General

## 2025-02-07 NOTE — ED PROVIDER NOTE - PHYSICAL EXAMINATION
GEN - unresponsive  HEAD - NC/AT   ENT: Airway patent, mmm  PULMONARY - b/l BS  CARDIAC -asystole   ABDOMEN - soft, no ascites on bedside sono  EXTREMITIES - normal appearing  SKIN - no rash or bruising   NEUROLOGIC - unresponsive
Normal rate, regular rhythm.  Heart sounds S1, S2.  No murmurs, rubs or gallops.

## 2025-06-07 NOTE — ED PROVIDER NOTE - NEURO NEGATIVE STATEMENT, MLM
Principal Discharge DX:	Arm paresthesia, right   no loss of consciousness, no gait abnormality, no headache, no sensory deficits, and no weakness. 1